# Patient Record
Sex: FEMALE | Race: WHITE | Employment: OTHER | URBAN - NONMETROPOLITAN AREA
[De-identification: names, ages, dates, MRNs, and addresses within clinical notes are randomized per-mention and may not be internally consistent; named-entity substitution may affect disease eponyms.]

---

## 2017-01-01 ENCOUNTER — HOSPITAL ENCOUNTER (OUTPATIENT)
Dept: INFUSION THERAPY | Age: 82
Discharge: HOME | End: 2017-10-16
Payer: MEDICARE

## 2017-01-01 ENCOUNTER — OFFICE VISIT (OUTPATIENT)
Dept: ONCOLOGY | Age: 82
End: 2017-01-01
Payer: MEDICARE

## 2017-01-01 VITALS
HEART RATE: 84 BPM | TEMPERATURE: 98 F | RESPIRATION RATE: 20 BRPM | OXYGEN SATURATION: 96 % | HEIGHT: 65 IN | BODY MASS INDEX: 37.32 KG/M2 | SYSTOLIC BLOOD PRESSURE: 151 MMHG | WEIGHT: 224 LBS | DIASTOLIC BLOOD PRESSURE: 78 MMHG

## 2017-01-01 DIAGNOSIS — C50.912 MALIGNANT NEOPLASM OF LEFT FEMALE BREAST, UNSPECIFIED ESTROGEN RECEPTOR STATUS, UNSPECIFIED SITE OF BREAST (HCC): Primary | ICD-10-CM

## 2017-01-01 DIAGNOSIS — C50.912 MALIGNANT NEOPLASM OF LEFT FEMALE BREAST (HCC): ICD-10-CM

## 2017-01-01 DIAGNOSIS — C50.912 MALIGNANT NEOPLASM OF LEFT FEMALE BREAST, UNSPECIFIED SITE OF BREAST: Primary | ICD-10-CM

## 2017-01-01 LAB
ALBUMIN SERPL-MCNC: 4.4 G/DL (ref 3.5–5.1)
ALP BLD-CCNC: 84 U/L (ref 38–126)
ALT SERPL-CCNC: 15 U/L (ref 11–66)
AST SERPL-CCNC: 20 U/L (ref 5–40)
BASINOPHIL, AUTOMATED: 1 % (ref 0–12)
BILIRUB SERPL-MCNC: 0.5 MG/DL (ref 0.3–1.2)
BILIRUBIN DIRECT: < 0.2 MG/DL (ref 0–0.3)
BUN, WHOLE BLOOD: 24 MG/DL (ref 8–26)
CHLORIDE, WHOLE BLOOD: 102 MEQ/L (ref 98–109)
CREATININE, WHOLE BLOOD: 1.3 MG/DL (ref 0.5–1.2)
EOSINOPHILS RELATIVE PERCENT: 2 % (ref 0–12)
GFR, ESTIMATED ,CON: 42 ML/MIN/1.73M2
GLUCOSE, WHOLE BLOOD: 138 MG/DL (ref 70–108)
HCT VFR BLD CALC: 42.7 % (ref 37–47)
HEMOGLOBIN: 14.4 GM/DL (ref 12–16)
IONIZED CALCIUM, WHOLE BLOOD: 1.19 MMOL/L (ref 1.12–1.32)
LYMPHOCYTES # BLD: 25 % (ref 15–47)
MCH RBC QN AUTO: 30.2 PG (ref 27–31)
MCHC RBC AUTO-ENTMCNC: 33.7 GM/DL (ref 33–37)
MCV RBC AUTO: 90 FL (ref 81–99)
MONOCYTES: 7 % (ref 0–12)
PDW BLD-RTO: 13.1 % (ref 11.5–14.5)
PLATELET # BLD: 240 THOU/MM3 (ref 130–400)
PMV BLD AUTO: 6.9 MCM (ref 7.4–10.4)
POTASSIUM, WHOLE BLOOD: 4.3 MEQ/L (ref 3.5–4.9)
RBC # BLD: 4.75 MILL/MM3 (ref 4.2–5.4)
SEG NEUTROPHILS: 66 % (ref 43–75)
SODIUM, WHOLE BLOOD: 143 MEQ/L (ref 138–146)
TOTAL CO2, WHOLE BLOOD: 25 MEQ/L (ref 23–33)
TOTAL PROTEIN: 8 G/DL (ref 6.1–8)
WBC # BLD: 10.9 THOU/MM3 (ref 4.8–10.8)

## 2017-01-01 PROCEDURE — 1090F PRES/ABSN URINE INCON ASSESS: CPT | Performed by: INTERNAL MEDICINE

## 2017-01-01 PROCEDURE — 1036F TOBACCO NON-USER: CPT | Performed by: INTERNAL MEDICINE

## 2017-01-01 PROCEDURE — 99211 OFF/OP EST MAY X REQ PHY/QHP: CPT

## 2017-01-01 PROCEDURE — G8417 CALC BMI ABV UP PARAM F/U: HCPCS | Performed by: INTERNAL MEDICINE

## 2017-01-01 PROCEDURE — 1123F ACP DISCUSS/DSCN MKR DOCD: CPT | Performed by: INTERNAL MEDICINE

## 2017-01-01 PROCEDURE — G8427 DOCREV CUR MEDS BY ELIG CLIN: HCPCS | Performed by: INTERNAL MEDICINE

## 2017-01-01 PROCEDURE — 80047 BASIC METABLC PNL IONIZED CA: CPT

## 2017-01-01 PROCEDURE — 85025 COMPLETE CBC W/AUTO DIFF WBC: CPT

## 2017-01-01 PROCEDURE — 99213 OFFICE O/P EST LOW 20 MIN: CPT | Performed by: INTERNAL MEDICINE

## 2017-01-01 PROCEDURE — 4040F PNEUMOC VAC/ADMIN/RCVD: CPT | Performed by: INTERNAL MEDICINE

## 2017-01-01 PROCEDURE — 36415 COLL VENOUS BLD VENIPUNCTURE: CPT

## 2017-01-01 PROCEDURE — G8484 FLU IMMUNIZE NO ADMIN: HCPCS | Performed by: INTERNAL MEDICINE

## 2017-01-01 PROCEDURE — G8400 PT W/DXA NO RESULTS DOC: HCPCS | Performed by: INTERNAL MEDICINE

## 2017-01-01 PROCEDURE — 80076 HEPATIC FUNCTION PANEL: CPT

## 2017-04-17 ENCOUNTER — OFFICE VISIT (OUTPATIENT)
Dept: ONCOLOGY | Age: 82
End: 2017-04-17

## 2017-04-17 VITALS
SYSTOLIC BLOOD PRESSURE: 152 MMHG | WEIGHT: 228.2 LBS | TEMPERATURE: 97.1 F | HEART RATE: 92 BPM | BODY MASS INDEX: 38.02 KG/M2 | OXYGEN SATURATION: 99 % | RESPIRATION RATE: 18 BRPM | HEIGHT: 65 IN | DIASTOLIC BLOOD PRESSURE: 83 MMHG

## 2017-04-17 DIAGNOSIS — Z79.811 ENCOUNTER FOR MONITORING AROMATASE INHIBITOR THERAPY: Primary | ICD-10-CM

## 2017-04-17 DIAGNOSIS — Z51.81 ENCOUNTER FOR MONITORING AROMATASE INHIBITOR THERAPY: Primary | ICD-10-CM

## 2017-04-17 PROCEDURE — 1123F ACP DISCUSS/DSCN MKR DOCD: CPT | Performed by: INTERNAL MEDICINE

## 2017-04-17 PROCEDURE — G8427 DOCREV CUR MEDS BY ELIG CLIN: HCPCS | Performed by: INTERNAL MEDICINE

## 2017-04-17 PROCEDURE — G8417 CALC BMI ABV UP PARAM F/U: HCPCS | Performed by: INTERNAL MEDICINE

## 2017-04-17 PROCEDURE — 1036F TOBACCO NON-USER: CPT | Performed by: INTERNAL MEDICINE

## 2017-04-17 PROCEDURE — G8400 PT W/DXA NO RESULTS DOC: HCPCS | Performed by: INTERNAL MEDICINE

## 2017-04-17 PROCEDURE — 1090F PRES/ABSN URINE INCON ASSESS: CPT | Performed by: INTERNAL MEDICINE

## 2017-04-17 PROCEDURE — 4040F PNEUMOC VAC/ADMIN/RCVD: CPT | Performed by: INTERNAL MEDICINE

## 2017-04-17 PROCEDURE — 99214 OFFICE O/P EST MOD 30 MIN: CPT | Performed by: INTERNAL MEDICINE

## 2017-04-17 RX ORDER — NYSTATIN 100000 [USP'U]/G
POWDER TOPICAL
Qty: 1 BOTTLE | Refills: 5 | Status: SHIPPED | OUTPATIENT
Start: 2017-04-17 | End: 2018-01-01 | Stop reason: ALTCHOICE

## 2017-10-16 NOTE — PROGRESS NOTES
Respiratory: Negative. Cardiovascular: Negative. Gastrointestinal: Negative. Genitourinary: Negative. Musculoskeletal: Negative. Skin: Negative. Neurological: Negative. Hematological: Negative. Psychiatric/Behavioral: Negative. Objective:   Physical Exam   Vitals:    10/16/17 1457   BP: (!) 151/78   Pulse: 84   Resp: 20   Temp: 98 °F (36.7 °C)   SpO2: 96%   Vitals reviewed and are stable. Constitutional: Well-developed and well-nourished. No acute distress. HENT: Normocephalic and atraumatic. Eyes: Pupils are equal and reactive. No scleral icterus. Neck: Overall appearance is symmetrical. No identifiable masses. Chest: Inspection and palpation of chest is normal.  Pulmonary: Effort normal. No respiratory distress. Cardiovascular: RRR. No edema in any of the four extremities. Abdominal: Soft. No hepatomegaly or splenomegaly. Musculoskeletal: Gait is normal. Muscle strength and tone good. Neurological: Alert and oriented to person, place, and time. Judgment and thought content normal.  Skin: Skin is warm and dry. No rash. Psychiatric: Mood and affect appropriate for the clinical situation. Behavior is normal.        Data Analysis:    Hematology 10/16/2017 4/17/2017 10/17/2016   WBC 10.9 (H) 9.8 10.8   RBC 4.75 4.88 4.71   HGB 14.4 15.0 14.5   HCT 42.7 42.5 42.6   MCV 90 87 90   RDW 13.1 12.6 12.3    243 261     Hematology 4/4/2016   WBC 10.9 (H)   RBC 4.62   HGB 14.3   HCT 41.9   MCV 91   RDW 12.4        Assessment:   1. Breast cancer. 2.  Hypertension. 3.  Leukocytosis. Plan:   1. Monitor for recurrence of malignancy. 2.  Monitor blood pressure and follow up with primary care provider for further surveillance and management. 3.  Monitor total WBC count and for signs of infection/fever. 4.  Return to clinic in one year.      Cruz Odonnell M.D.  [facility]  [facility] [address], [address] [place], [address] [place] [address], [address] \[facility]\""

## 2018-01-01 ENCOUNTER — ANESTHESIA (OUTPATIENT)
Dept: OPERATING ROOM | Age: 83
DRG: 236 | End: 2018-01-01
Payer: MEDICARE

## 2018-01-01 ENCOUNTER — APPOINTMENT (OUTPATIENT)
Dept: GENERAL RADIOLOGY | Age: 83
DRG: 236 | End: 2018-01-01
Attending: THORACIC SURGERY (CARDIOTHORACIC VASCULAR SURGERY)
Payer: MEDICARE

## 2018-01-01 ENCOUNTER — HOSPITAL ENCOUNTER (INPATIENT)
Age: 83
LOS: 11 days | Discharge: NURSING, REHAB OR LONG-TERM CARE | DRG: 236 | End: 2018-02-23
Attending: THORACIC SURGERY (CARDIOTHORACIC VASCULAR SURGERY) | Admitting: THORACIC SURGERY (CARDIOTHORACIC VASCULAR SURGERY)
Payer: MEDICARE

## 2018-01-01 ENCOUNTER — TELEPHONE (OUTPATIENT)
Dept: CARDIOLOGY CLINIC | Age: 83
End: 2018-01-01

## 2018-01-01 ENCOUNTER — APPOINTMENT (OUTPATIENT)
Dept: GENERAL RADIOLOGY | Age: 83
DRG: 287 | End: 2018-01-01
Attending: INTERNAL MEDICINE
Payer: MEDICARE

## 2018-01-01 ENCOUNTER — PREP FOR PROCEDURE (OUTPATIENT)
Dept: CARDIOLOGY CLINIC | Age: 83
End: 2018-01-01

## 2018-01-01 ENCOUNTER — HOSPITAL ENCOUNTER (OUTPATIENT)
Dept: GENERAL RADIOLOGY | Age: 83
Discharge: HOME | DRG: 236 | End: 2018-02-09
Attending: THORACIC SURGERY (CARDIOTHORACIC VASCULAR SURGERY)
Payer: MEDICARE

## 2018-01-01 ENCOUNTER — HOSPITAL ENCOUNTER (INPATIENT)
Dept: INPATIENT UNIT | Age: 83
LOS: 1 days | Discharge: HOME | DRG: 287 | End: 2018-02-08
Attending: INTERNAL MEDICINE | Admitting: INTERNAL MEDICINE
Payer: MEDICARE

## 2018-01-01 ENCOUNTER — TELEPHONE (OUTPATIENT)
Dept: CARDIOTHORACIC SURGERY | Age: 83
End: 2018-01-01

## 2018-01-01 ENCOUNTER — OFFICE VISIT (OUTPATIENT)
Dept: CARDIOTHORACIC SURGERY | Age: 83
End: 2018-01-01

## 2018-01-01 ENCOUNTER — APPOINTMENT (OUTPATIENT)
Dept: GENERAL RADIOLOGY | Age: 83
DRG: 215 | End: 2018-01-01
Attending: INTERNAL MEDICINE
Payer: MEDICARE

## 2018-01-01 ENCOUNTER — ANESTHESIA EVENT (OUTPATIENT)
Dept: OPERATING ROOM | Age: 83
DRG: 236 | End: 2018-01-01
Payer: MEDICARE

## 2018-01-01 ENCOUNTER — APPOINTMENT (OUTPATIENT)
Dept: INTERVENTIONAL RADIOLOGY/VASCULAR | Age: 83
DRG: 287 | End: 2018-01-01
Attending: INTERNAL MEDICINE
Payer: MEDICARE

## 2018-01-01 ENCOUNTER — HOSPITAL ENCOUNTER (INPATIENT)
Age: 83
LOS: 1 days | DRG: 215 | End: 2018-06-19
Attending: INTERNAL MEDICINE | Admitting: INTERNAL MEDICINE
Payer: MEDICARE

## 2018-01-01 ENCOUNTER — ANESTHESIA (OUTPATIENT)
Dept: CARDIOVASCULAR ICU | Age: 83
DRG: 215 | End: 2018-01-01
Payer: MEDICARE

## 2018-01-01 ENCOUNTER — APPOINTMENT (OUTPATIENT)
Dept: CARDIAC CATH/INVASIVE PROCEDURES | Age: 83
DRG: 287 | End: 2018-01-01
Attending: INTERNAL MEDICINE
Payer: MEDICARE

## 2018-01-01 ENCOUNTER — ANESTHESIA EVENT (OUTPATIENT)
Dept: CARDIOVASCULAR ICU | Age: 83
DRG: 215 | End: 2018-01-01
Payer: MEDICARE

## 2018-01-01 ENCOUNTER — APPOINTMENT (OUTPATIENT)
Dept: CARDIAC CATH/INVASIVE PROCEDURES | Age: 83
DRG: 215 | End: 2018-01-01
Attending: INTERNAL MEDICINE
Payer: MEDICARE

## 2018-01-01 ENCOUNTER — HOSPITAL ENCOUNTER (OUTPATIENT)
Dept: PREADMISSION TESTING | Age: 83
Discharge: HOME | DRG: 236 | End: 2018-02-13
Payer: MEDICARE

## 2018-01-01 VITALS
BODY MASS INDEX: 30.07 KG/M2 | HEIGHT: 67 IN | WEIGHT: 191.6 LBS | DIASTOLIC BLOOD PRESSURE: 86 MMHG | HEART RATE: 69 BPM | SYSTOLIC BLOOD PRESSURE: 173 MMHG

## 2018-01-01 VITALS
TEMPERATURE: 97 F | WEIGHT: 211 LBS | HEART RATE: 70 BPM | BODY MASS INDEX: 33.12 KG/M2 | DIASTOLIC BLOOD PRESSURE: 70 MMHG | HEIGHT: 67 IN | SYSTOLIC BLOOD PRESSURE: 140 MMHG | RESPIRATION RATE: 16 BRPM | OXYGEN SATURATION: 98 %

## 2018-01-01 VITALS
BODY MASS INDEX: 30.1 KG/M2 | HEIGHT: 67 IN | SYSTOLIC BLOOD PRESSURE: 145 MMHG | HEART RATE: 68 BPM | WEIGHT: 191.8 LBS | DIASTOLIC BLOOD PRESSURE: 73 MMHG

## 2018-01-01 VITALS
WEIGHT: 216.05 LBS | RESPIRATION RATE: 18 BRPM | DIASTOLIC BLOOD PRESSURE: 52 MMHG | SYSTOLIC BLOOD PRESSURE: 113 MMHG | TEMPERATURE: 97.3 F | HEART RATE: 96 BPM | HEIGHT: 66 IN | BODY MASS INDEX: 34.72 KG/M2

## 2018-01-01 VITALS
SYSTOLIC BLOOD PRESSURE: 143 MMHG | HEART RATE: 72 BPM | HEIGHT: 66 IN | DIASTOLIC BLOOD PRESSURE: 75 MMHG | TEMPERATURE: 97.7 F | BODY MASS INDEX: 36.32 KG/M2 | OXYGEN SATURATION: 96 % | WEIGHT: 225.97 LBS | RESPIRATION RATE: 16 BRPM

## 2018-01-01 VITALS
DIASTOLIC BLOOD PRESSURE: 57 MMHG | BODY MASS INDEX: 30.07 KG/M2 | HEART RATE: 76 BPM | RESPIRATION RATE: 16 BRPM | SYSTOLIC BLOOD PRESSURE: 102 MMHG | TEMPERATURE: 97.5 F | WEIGHT: 191.6 LBS | HEIGHT: 67 IN | OXYGEN SATURATION: 95 %

## 2018-01-01 VITALS
WEIGHT: 198 LBS | BODY MASS INDEX: 31.08 KG/M2 | HEIGHT: 67 IN | DIASTOLIC BLOOD PRESSURE: 69 MMHG | HEART RATE: 72 BPM | SYSTOLIC BLOOD PRESSURE: 123 MMHG

## 2018-01-01 VITALS — SYSTOLIC BLOOD PRESSURE: 72 MMHG | DIASTOLIC BLOOD PRESSURE: 43 MMHG | OXYGEN SATURATION: 100 % | TEMPERATURE: 14.7 F

## 2018-01-01 VITALS
HEIGHT: 67 IN | BODY MASS INDEX: 30.07 KG/M2 | WEIGHT: 191.6 LBS | HEART RATE: 63 BPM | DIASTOLIC BLOOD PRESSURE: 90 MMHG | SYSTOLIC BLOOD PRESSURE: 152 MMHG

## 2018-01-01 DIAGNOSIS — I25.119 CORONARY ARTERY DISEASE INVOLVING NATIVE CORONARY ARTERY OF NATIVE HEART WITH ANGINA PECTORIS (HCC): ICD-10-CM

## 2018-01-01 DIAGNOSIS — E44.0 MODERATE MALNUTRITION (HCC): ICD-10-CM

## 2018-01-01 DIAGNOSIS — T81.30XA WOUND DEHISCENCE: Primary | ICD-10-CM

## 2018-01-01 DIAGNOSIS — T81.31XD POSTOPERATIVE WOUND DEHISCENCE, SUBSEQUENT ENCOUNTER: Primary | ICD-10-CM

## 2018-01-01 DIAGNOSIS — R79.9 ABNORMAL FINDING OF BLOOD CHEMISTRY: ICD-10-CM

## 2018-01-01 DIAGNOSIS — A04.72 CLOSTRIDIUM DIFFICILE COLITIS: ICD-10-CM

## 2018-01-01 DIAGNOSIS — I25.119 CORONARY ARTERY DISEASE INVOLVING NATIVE CORONARY ARTERY OF NATIVE HEART WITH ANGINA PECTORIS (HCC): Primary | ICD-10-CM

## 2018-01-01 DIAGNOSIS — Z95.1 S/P CABG X 2: ICD-10-CM

## 2018-01-01 DIAGNOSIS — Z98.890 S/P CARDIAC CATH: Primary | ICD-10-CM

## 2018-01-01 LAB
ABO: NORMAL
ACTIVATED CLOTTING TIME: 129 SECONDS (ref 99–130)
ACTIVATED CLOTTING TIME: 132 SECONDS (ref 99–130)
ACTIVATED CLOTTING TIME: 166 SECONDS (ref 99–130)
ACTIVATED CLOTTING TIME: 373 SECONDS (ref 99–130)
ACTIVATED CLOTTING TIME: 506 SECONDS (ref 99–130)
ACTIVATED CLOTTING TIME: 559 SECONDS (ref 99–130)
ACTIVATED CLOTTING TIME: 630 SECONDS (ref 99–130)
ALLEN TEST: POSITIVE
ALLEN TEST: POSITIVE
AMORPHOUS: ABNORMAL
ANION GAP SERPL CALCULATED.3IONS-SCNC: 11 MEQ/L (ref 8–16)
ANION GAP SERPL CALCULATED.3IONS-SCNC: 11 MEQ/L (ref 8–16)
ANION GAP SERPL CALCULATED.3IONS-SCNC: 12 MEQ/L (ref 8–16)
ANION GAP SERPL CALCULATED.3IONS-SCNC: 13 MEQ/L (ref 8–16)
ANION GAP SERPL CALCULATED.3IONS-SCNC: 14 MEQ/L (ref 8–16)
ANION GAP SERPL CALCULATED.3IONS-SCNC: 15 MEQ/L (ref 8–16)
ANION GAP SERPL CALCULATED.3IONS-SCNC: 16 MEQ/L (ref 8–16)
ANION GAP SERPL CALCULATED.3IONS-SCNC: 16 MEQ/L (ref 8–16)
ANION GAP SERPL CALCULATED.3IONS-SCNC: 17 MEQ/L (ref 8–16)
ANION GAP SERPL CALCULATED.3IONS-SCNC: 18 MEQ/L (ref 8–16)
ANION GAP SERPL CALCULATED.3IONS-SCNC: 9 MEQ/L (ref 8–16)
ANTIBODY SCREEN: NORMAL
APTT: 25.3 SECONDS (ref 22–38)
APTT: 29.4 SECONDS (ref 22–38)
APTT: 45.4 SECONDS (ref 22–38)
APTT: 50.8 SECONDS (ref 22–38)
APTT: 64.1 SECONDS (ref 22–38)
APTT: 77 SECONDS (ref 22–38)
AVERAGE GLUCOSE: 84 MG/DL (ref 70–126)
BACTERIA: ABNORMAL
BACTERIA: NORMAL
BASE EXCESS (CALCULATED): -0.2 MMOL/L (ref -2.5–2.5)
BASE EXCESS (CALCULATED): -1.8 MMOL/L (ref -2.5–2.5)
BASE EXCESS (CALCULATED): -2.2 MMOL/L (ref -2.5–2.5)
BASE EXCESS (CALCULATED): -2.3 MMOL/L (ref -2.5–2.5)
BASE EXCESS (CALCULATED): -2.6 MMOL/L (ref -2.5–2.5)
BASE EXCESS (CALCULATED): -2.9 MMOL/L (ref -2.5–2.5)
BASE EXCESS (CALCULATED): -3.5 MMOL/L (ref -2.5–2.5)
BASE EXCESS (CALCULATED): 0.6 MMOL/L (ref -2.5–2.5)
BASE EXCESS (CALCULATED): 0.9 MMOL/L (ref -2.5–2.5)
BASE EXCESS (CALCULATED): 1.1 MMOL/L (ref -2.5–2.5)
BASE EXCESS (CALCULATED): 1.2 MMOL/L (ref -2.5–2.5)
BASE EXCESS (CALCULATED): 15.3 MMOL/L (ref -2.5–2.5)
BILIRUBIN URINE: ABNORMAL
BILIRUBIN URINE: NEGATIVE
BLOOD, URINE: ABNORMAL
BLOOD, URINE: ABNORMAL
BLOOD, URINE: NEGATIVE
BUN BLDV-MCNC: 13 MG/DL (ref 7–22)
BUN BLDV-MCNC: 18 MG/DL (ref 7–22)
BUN BLDV-MCNC: 20 MG/DL (ref 7–22)
BUN BLDV-MCNC: 26 MG/DL (ref 7–22)
BUN BLDV-MCNC: 29 MG/DL (ref 7–22)
BUN BLDV-MCNC: 29 MG/DL (ref 7–22)
BUN BLDV-MCNC: 30 MG/DL (ref 7–22)
BUN BLDV-MCNC: 32 MG/DL (ref 7–22)
BUN BLDV-MCNC: 35 MG/DL (ref 7–22)
BUN BLDV-MCNC: 36 MG/DL (ref 7–22)
BUN BLDV-MCNC: 39 MG/DL (ref 7–22)
BUN BLDV-MCNC: 40 MG/DL (ref 7–22)
BUN BLDV-MCNC: 42 MG/DL (ref 7–22)
BUN BLDV-MCNC: 44 MG/DL (ref 7–22)
BUN BLDV-MCNC: 45 MG/DL (ref 7–22)
CALCIUM IONIZED SERUM: 1.01 MMOL/L (ref 1.12–1.32)
CALCIUM IONIZED SERUM: 1.02 MMOL/L (ref 1.12–1.32)
CALCIUM IONIZED SERUM: 1.07 MMOL/L (ref 1.12–1.32)
CALCIUM IONIZED SERUM: 1.14 MMOL/L (ref 1.12–1.32)
CALCIUM IONIZED SERUM: 1.15 MMOL/L (ref 1.12–1.32)
CALCIUM IONIZED: 1.1 MMOL/L (ref 1.12–1.32)
CALCIUM IONIZED: 1.11 MMOL/L (ref 1.12–1.32)
CALCIUM IONIZED: 1.24 MMOL/L (ref 1.12–1.32)
CALCIUM SERPL-MCNC: 7.6 MG/DL (ref 8.5–10.5)
CALCIUM SERPL-MCNC: 7.7 MG/DL (ref 8.5–10.5)
CALCIUM SERPL-MCNC: 7.8 MG/DL (ref 8.5–10.5)
CALCIUM SERPL-MCNC: 7.8 MG/DL (ref 8.5–10.5)
CALCIUM SERPL-MCNC: 7.9 MG/DL (ref 8.5–10.5)
CALCIUM SERPL-MCNC: 8 MG/DL (ref 8.5–10.5)
CALCIUM SERPL-MCNC: 8 MG/DL (ref 8.5–10.5)
CALCIUM SERPL-MCNC: 8.1 MG/DL (ref 8.5–10.5)
CALCIUM SERPL-MCNC: 8.7 MG/DL (ref 8.5–10.5)
CALCIUM SERPL-MCNC: 9.5 MG/DL (ref 8.5–10.5)
CARTRIDGE COLOR: NORMAL
CARTRIDGE COLOR: NORMAL
CASTS: ABNORMAL /LPF
CASTS: NORMAL /LPF
CASTS: NORMAL /LPF
CHARACTER, URINE: ABNORMAL
CHARACTER, URINE: CLEAR
CHARACTER, URINE: CLEAR
CHLORIDE BLD-SCNC: 102 MEQ/L (ref 98–111)
CHLORIDE BLD-SCNC: 102 MEQ/L (ref 98–111)
CHLORIDE BLD-SCNC: 103 MEQ/L (ref 98–111)
CHLORIDE BLD-SCNC: 105 MEQ/L (ref 98–111)
CHLORIDE BLD-SCNC: 106 MEQ/L (ref 98–111)
CHLORIDE BLD-SCNC: 108 MEQ/L (ref 98–111)
CHLORIDE BLD-SCNC: 109 MEQ/L (ref 98–111)
CHOLESTEROL, TOTAL: 143 MG/DL (ref 100–199)
CHOLESTEROL, TOTAL: 170 MG/DL (ref 100–199)
CLOSTRIDIUM DIFFICILE, PCR: POSITIVE
CO2: 19 MEQ/L (ref 23–33)
CO2: 20 MEQ/L (ref 23–33)
CO2: 20 MEQ/L (ref 23–33)
CO2: 21 MEQ/L (ref 23–33)
CO2: 22 MEQ/L (ref 23–33)
CO2: 24 MEQ/L (ref 23–33)
CO2: 24 MEQ/L (ref 23–33)
CO2: 25 MEQ/L (ref 23–33)
COLLECTED BY:: ABNORMAL
COLLECTED BY:: NORMAL
COLLECTED BY:: NORMAL
COLOR: ABNORMAL
COLOR: YELLOW
COMMENT: ABNORMAL
CREAT SERPL-MCNC: 0.9 MG/DL (ref 0.4–1.2)
CREAT SERPL-MCNC: 1.1 MG/DL (ref 0.4–1.2)
CREAT SERPL-MCNC: 1.2 MG/DL (ref 0.4–1.2)
CREAT SERPL-MCNC: 1.3 MG/DL (ref 0.4–1.2)
CREAT SERPL-MCNC: 1.4 MG/DL (ref 0.4–1.2)
CREAT SERPL-MCNC: 1.4 MG/DL (ref 0.4–1.2)
CREAT SERPL-MCNC: 1.6 MG/DL (ref 0.4–1.2)
CREAT SERPL-MCNC: 1.6 MG/DL (ref 0.4–1.2)
CREAT SERPL-MCNC: 1.7 MG/DL (ref 0.4–1.2)
CRYSTALS: ABNORMAL
CRYSTALS: NORMAL
DEVICE: ABNORMAL
DEVICE: NORMAL
DEVICE: NORMAL
EKG ATRIAL RATE: 120 BPM
EKG ATRIAL RATE: 60 BPM
EKG ATRIAL RATE: 68 BPM
EKG ATRIAL RATE: 70 BPM
EKG ATRIAL RATE: 71 BPM
EKG ATRIAL RATE: 74 BPM
EKG ATRIAL RATE: 86 BPM
EKG P AXIS: -9 DEGREES
EKG P AXIS: 11 DEGREES
EKG P AXIS: 29 DEGREES
EKG P AXIS: 39 DEGREES
EKG P AXIS: 58 DEGREES
EKG P AXIS: 64 DEGREES
EKG P-R INTERVAL: 166 MS
EKG P-R INTERVAL: 180 MS
EKG P-R INTERVAL: 188 MS
EKG P-R INTERVAL: 194 MS
EKG P-R INTERVAL: 198 MS
EKG P-R INTERVAL: 208 MS
EKG Q-T INTERVAL: 398 MS
EKG Q-T INTERVAL: 402 MS
EKG Q-T INTERVAL: 422 MS
EKG Q-T INTERVAL: 438 MS
EKG Q-T INTERVAL: 446 MS
EKG Q-T INTERVAL: 452 MS
EKG Q-T INTERVAL: 480 MS
EKG QRS DURATION: 106 MS
EKG QRS DURATION: 116 MS
EKG QRS DURATION: 138 MS
EKG QRS DURATION: 86 MS
EKG QRS DURATION: 90 MS
EKG QRS DURATION: 92 MS
EKG QRS DURATION: 96 MS
EKG QTC CALCULATION (BAZETT): 458 MS
EKG QTC CALCULATION (BAZETT): 473 MS
EKG QTC CALCULATION (BAZETT): 476 MS
EKG QTC CALCULATION (BAZETT): 480 MS
EKG QTC CALCULATION (BAZETT): 480 MS
EKG QTC CALCULATION (BAZETT): 495 MS
EKG QTC CALCULATION (BAZETT): 586 MS
EKG R AXIS: -17 DEGREES
EKG R AXIS: -19 DEGREES
EKG R AXIS: 21 DEGREES
EKG R AXIS: 23 DEGREES
EKG R AXIS: 42 DEGREES
EKG R AXIS: 8 DEGREES
EKG T AXIS: 125 DEGREES
EKG T AXIS: 179 DEGREES
EKG T AXIS: 22 DEGREES
EKG T AXIS: 30 DEGREES
EKG T AXIS: 51 DEGREES
EKG T AXIS: 75 DEGREES
EKG T AXIS: 76 DEGREES
EKG VENTRICULAR RATE: 128 BPM
EKG VENTRICULAR RATE: 60 BPM
EKG VENTRICULAR RATE: 68 BPM
EKG VENTRICULAR RATE: 70 BPM
EKG VENTRICULAR RATE: 71 BPM
EKG VENTRICULAR RATE: 74 BPM
EKG VENTRICULAR RATE: 86 BPM
EPITHELIAL CELLS, UA: ABNORMAL /HPF
EPITHELIAL CELLS, UA: NORMAL /HPF
FIBRINOGEN: 230 MG/100ML (ref 155–475)
GFR SERPL CREATININE-BSD FRML MDRD: 29 ML/MIN/1.73M2
GFR SERPL CREATININE-BSD FRML MDRD: 31 ML/MIN/1.73M2
GFR SERPL CREATININE-BSD FRML MDRD: 31 ML/MIN/1.73M2
GFR SERPL CREATININE-BSD FRML MDRD: 36 ML/MIN/1.73M2
GFR SERPL CREATININE-BSD FRML MDRD: 36 ML/MIN/1.73M2
GFR SERPL CREATININE-BSD FRML MDRD: 39 ML/MIN/1.73M2
GFR SERPL CREATININE-BSD FRML MDRD: 43 ML/MIN/1.73M2
GFR SERPL CREATININE-BSD FRML MDRD: 48 ML/MIN/1.73M2
GFR SERPL CREATININE-BSD FRML MDRD: 60 ML/MIN/1.73M2
GLUCOSE BLD-MCNC: 101 MG/DL (ref 70–108)
GLUCOSE BLD-MCNC: 101 MG/DL (ref 70–108)
GLUCOSE BLD-MCNC: 102 MG/DL (ref 70–108)
GLUCOSE BLD-MCNC: 105 MG/DL (ref 70–108)
GLUCOSE BLD-MCNC: 109 MG/DL (ref 70–108)
GLUCOSE BLD-MCNC: 111 MG/DL (ref 70–108)
GLUCOSE BLD-MCNC: 112 MG/DL (ref 70–108)
GLUCOSE BLD-MCNC: 113 MG/DL (ref 70–108)
GLUCOSE BLD-MCNC: 114 MG/DL (ref 70–108)
GLUCOSE BLD-MCNC: 115 MG/DL (ref 70–108)
GLUCOSE BLD-MCNC: 116 MG/DL (ref 70–108)
GLUCOSE BLD-MCNC: 117 MG/DL (ref 70–108)
GLUCOSE BLD-MCNC: 118 MG/DL (ref 70–108)
GLUCOSE BLD-MCNC: 119 MG/DL (ref 70–108)
GLUCOSE BLD-MCNC: 119 MG/DL (ref 70–108)
GLUCOSE BLD-MCNC: 120 MG/DL (ref 70–108)
GLUCOSE BLD-MCNC: 121 MG/DL (ref 70–108)
GLUCOSE BLD-MCNC: 122 MG/DL (ref 70–108)
GLUCOSE BLD-MCNC: 123 MG/DL (ref 70–108)
GLUCOSE BLD-MCNC: 125 MG/DL (ref 70–108)
GLUCOSE BLD-MCNC: 126 MG/DL (ref 70–108)
GLUCOSE BLD-MCNC: 128 MG/DL (ref 70–108)
GLUCOSE BLD-MCNC: 132 MG/DL (ref 70–108)
GLUCOSE BLD-MCNC: 136 MG/DL (ref 70–108)
GLUCOSE BLD-MCNC: 137 MG/DL (ref 70–108)
GLUCOSE BLD-MCNC: 137 MG/DL (ref 70–108)
GLUCOSE BLD-MCNC: 138 MG/DL (ref 70–108)
GLUCOSE BLD-MCNC: 140 MG/DL (ref 70–108)
GLUCOSE BLD-MCNC: 149 MG/DL (ref 70–108)
GLUCOSE BLD-MCNC: 156 MG/DL (ref 70–108)
GLUCOSE BLD-MCNC: 81 MG/DL (ref 70–108)
GLUCOSE BLD-MCNC: 82 MG/DL (ref 70–108)
GLUCOSE BLD-MCNC: 85 MG/DL (ref 70–108)
GLUCOSE BLD-MCNC: 86 MG/DL (ref 70–108)
GLUCOSE BLD-MCNC: 86 MG/DL (ref 70–108)
GLUCOSE BLD-MCNC: 87 MG/DL (ref 70–108)
GLUCOSE BLD-MCNC: 87 MG/DL (ref 70–108)
GLUCOSE BLD-MCNC: 92 MG/DL (ref 70–108)
GLUCOSE BLD-MCNC: 92 MG/DL (ref 70–108)
GLUCOSE BLD-MCNC: 94 MG/DL (ref 70–108)
GLUCOSE BLD-MCNC: 94 MG/DL (ref 70–108)
GLUCOSE BLD-MCNC: 99 MG/DL (ref 70–108)
GLUCOSE, URINE: NEGATIVE MG/DL
GLUCOSE, WHOLE BLOOD: 112 MG/DL (ref 70–108)
GLUCOSE, WHOLE BLOOD: 115 MG/DL (ref 70–108)
GLUCOSE, WHOLE BLOOD: 121 MG/DL (ref 70–108)
GLUCOSE, WHOLE BLOOD: 126 MG/DL (ref 70–108)
GLUCOSE, WHOLE BLOOD: 134 MG/DL (ref 70–108)
GLUCOSE, WHOLE BLOOD: 139 MG/DL (ref 70–108)
GLUCOSE, WHOLE BLOOD: 147 MG/DL (ref 70–108)
GLUCOSE, WHOLE BLOOD: 148 MG/DL (ref 70–108)
GLUCOSE, WHOLE BLOOD: 149 MG/DL (ref 70–108)
GLUCOSE, WHOLE BLOOD: 154 MG/DL (ref 70–108)
GLUCOSE, WHOLE BLOOD: 158 MG/DL (ref 70–108)
GLUCOSE, WHOLE BLOOD: 158 MG/DL (ref 70–108)
GLUCOSE, WHOLE BLOOD: 164 MG/DL (ref 70–108)
HBA1C MFR BLD: 4.8 % (ref 4.4–6.4)
HCO3: 20 MMOL/L (ref 23–28)
HCO3: 22 MMOL/L (ref 23–28)
HCO3: 23 MMOL/L (ref 23–28)
HCO3: 25 MMOL/L (ref 23–28)
HCO3: 26 MMOL/L (ref 23–28)
HCO3: 44 MMOL/L (ref 23–28)
HCT VFR BLD CALC: 20.2 % (ref 37–47)
HCT VFR BLD CALC: 22.4 % (ref 37–47)
HCT VFR BLD CALC: 22.8 % (ref 37–47)
HCT VFR BLD CALC: 22.8 % (ref 37–47)
HCT VFR BLD CALC: 23.7 % (ref 37–47)
HCT VFR BLD CALC: 24.5 % (ref 37–47)
HCT VFR BLD CALC: 26.2 % (ref 37–47)
HCT VFR BLD CALC: 26.3 % (ref 37–47)
HCT VFR BLD CALC: 26.7 % (ref 37–47)
HCT VFR BLD CALC: 27.2 % (ref 37–47)
HCT VFR BLD CALC: 27.5 % (ref 37–47)
HCT VFR BLD CALC: 29.1 % (ref 37–47)
HCT VFR BLD CALC: 29.4 % (ref 37–47)
HCT VFR BLD CALC: 29.8 % (ref 37–47)
HCT VFR BLD CALC: 30.1 % (ref 37–47)
HCT VFR BLD CALC: 36 % (ref 37–47)
HCT VFR BLD CALC: 38.2 % (ref 37–47)
HCT VFR BLD CALC: 38.8 % (ref 37–47)
HCT VFR BLD CALC: 41.4 % (ref 37–47)
HCT VFR BLD CALC: 43.9 % (ref 37–47)
HDLC SERPL-MCNC: 47 MG/DL
HDLC SERPL-MCNC: 50 MG/DL
HEMOGLOBIN FINGERSTICK, POC: 11.7 G/DL (ref 12–16)
HEMOGLOBIN FINGERSTICK, POC: 7.1 G/DL (ref 12–16)
HEMOGLOBIN FINGERSTICK, POC: 9.2 G/DL (ref 12–16)
HEMOGLOBIN FINGERSTICK, POC: 9.3 G/DL (ref 12–16)
HEMOGLOBIN: 10 GM/DL (ref 12–16)
HEMOGLOBIN: 10.2 GM/DL (ref 12–16)
HEMOGLOBIN: 11.4 GM/DL (ref 12–16)
HEMOGLOBIN: 12.4 GM/DL (ref 12–16)
HEMOGLOBIN: 12.9 GM/DL (ref 12–16)
HEMOGLOBIN: 14.2 GM/DL (ref 12–16)
HEMOGLOBIN: 14.8 GM/DL (ref 12–16)
HEMOGLOBIN: 6.8 GM/DL (ref 12–16)
HEMOGLOBIN: 7.4 GM/DL (ref 12–16)
HEMOGLOBIN: 7.6 GM/DL (ref 12–16)
HEMOGLOBIN: 7.7 GM/DL (ref 12–16)
HEMOGLOBIN: 7.9 GM/DL (ref 12–16)
HEMOGLOBIN: 8.3 GM/DL (ref 12–16)
HEMOGLOBIN: 8.7 GM/DL (ref 12–16)
HEMOGLOBIN: 8.8 GM/DL (ref 12–16)
HEMOGLOBIN: 9.2 GM/DL (ref 12–16)
HEMOGLOBIN: 9.3 GM/DL (ref 12–16)
HEMOGLOBIN: 9.5 GM/DL (ref 12–16)
HEMOGLOBIN: 9.8 GM/DL (ref 12–16)
HEMOGLOBIN: 9.8 GM/DL (ref 12–16)
ICTOTEST: NEGATIVE
IFIO2: 100
IFIO2: 35
IFIO2: 35
IFIO2: 6
IFIO2: 80
INR BLD: 1.09 (ref 0.85–1.13)
INR BLD: 1.15 (ref 0.85–1.13)
INR BLD: 1.37 (ref 0.85–1.13)
KETONES, URINE: NEGATIVE
LDL CHOLESTEROL CALCULATED: 104 MG/DL
LDL CHOLESTEROL CALCULATED: 67 MG/DL
LEUKOCYTE EST, POC: ABNORMAL
LEUKOCYTE EST, POC: NEGATIVE
LEUKOCYTE ESTERASE, URINE: NEGATIVE
MAGNESIUM: 1.7 MG/DL (ref 1.6–2.4)
MAGNESIUM: 1.7 MG/DL (ref 1.6–2.4)
MAGNESIUM: 2.1 MG/DL (ref 1.6–2.4)
MAGNESIUM: 2.1 MG/DL (ref 1.6–2.4)
MAGNESIUM: 2.2 MG/DL (ref 1.6–2.4)
MAGNESIUM: 2.6 MG/DL (ref 1.6–2.4)
MCH RBC QN AUTO: 26.9 PG (ref 27–31)
MCH RBC QN AUTO: 27.7 PG (ref 27–31)
MCH RBC QN AUTO: 30 PG (ref 27–31)
MCH RBC QN AUTO: 30.2 PG (ref 27–31)
MCH RBC QN AUTO: 30.4 PG (ref 27–31)
MCH RBC QN AUTO: 30.5 PG (ref 27–31)
MCH RBC QN AUTO: 30.6 PG (ref 27–31)
MCH RBC QN AUTO: 30.8 PG (ref 27–31)
MCH RBC QN AUTO: 30.8 PG (ref 27–31)
MCH RBC QN AUTO: 31.1 PG (ref 27–31)
MCH RBC QN AUTO: 31.7 PG (ref 27–31)
MCH RBC QN AUTO: 31.9 PG (ref 27–31)
MCH RBC QN AUTO: 32 PG (ref 27–31)
MCH RBC QN AUTO: 32.2 PG (ref 27–31)
MCH RBC QN AUTO: 32.6 PG (ref 27–31)
MCH RBC QN AUTO: 32.6 PG (ref 27–31)
MCH RBC QN AUTO: 33.8 PG (ref 27–31)
MCHC RBC AUTO-ENTMCNC: 31.6 GM/DL (ref 33–37)
MCHC RBC AUTO-ENTMCNC: 32.4 GM/DL (ref 33–37)
MCHC RBC AUTO-ENTMCNC: 33.2 GM/DL (ref 33–37)
MCHC RBC AUTO-ENTMCNC: 33.3 GM/DL (ref 33–37)
MCHC RBC AUTO-ENTMCNC: 33.4 GM/DL (ref 33–37)
MCHC RBC AUTO-ENTMCNC: 33.5 GM/DL (ref 33–37)
MCHC RBC AUTO-ENTMCNC: 33.6 GM/DL (ref 33–37)
MCHC RBC AUTO-ENTMCNC: 33.7 GM/DL (ref 33–37)
MCHC RBC AUTO-ENTMCNC: 33.9 GM/DL (ref 33–37)
MCHC RBC AUTO-ENTMCNC: 34 GM/DL (ref 33–37)
MCHC RBC AUTO-ENTMCNC: 34.2 GM/DL (ref 33–37)
MCHC RBC AUTO-ENTMCNC: 34.3 GM/DL (ref 33–37)
MCHC RBC AUTO-ENTMCNC: 35.7 GM/DL (ref 33–37)
MCV RBC AUTO: 85 FL (ref 81–99)
MCV RBC AUTO: 85.4 FL (ref 81–99)
MCV RBC AUTO: 89.9 FL (ref 81–99)
MCV RBC AUTO: 90 FL (ref 81–99)
MCV RBC AUTO: 90.1 FL (ref 81–99)
MCV RBC AUTO: 90.8 FL (ref 81–99)
MCV RBC AUTO: 91.3 FL (ref 81–99)
MCV RBC AUTO: 91.3 FL (ref 81–99)
MCV RBC AUTO: 91.4 FL (ref 81–99)
MCV RBC AUTO: 91.6 FL (ref 81–99)
MCV RBC AUTO: 94.5 FL (ref 81–99)
MCV RBC AUTO: 94.7 FL (ref 81–99)
MCV RBC AUTO: 95 FL (ref 81–99)
MCV RBC AUTO: 95.4 FL (ref 81–99)
MCV RBC AUTO: 95.5 FL (ref 81–99)
MCV RBC AUTO: 95.9 FL (ref 81–99)
MCV RBC AUTO: 96.1 FL (ref 81–99)
MISCELLANEOUS LAB TEST RESULT: ABNORMAL
MISCELLANEOUS LAB TEST RESULT: NORMAL
MODE: ABNORMAL
MODE: AC
MODE: NORMAL
MRSA SCREEN RT-PCR: NEGATIVE
MUCUS: ABNORMAL
NITRITE, URINE: NEGATIVE
O2 SATURATION: 100 %
O2 SATURATION: 82 %
O2 SATURATION: 89 %
O2 SATURATION: 95 %
O2 SATURATION: 96 %
O2 SATURATION: 97 %
O2 SATURATION: 98 %
ORGANISM: ABNORMAL
PATIENT BOLUS: NORMAL
PATIENT HEPARIN CONCENTRATION: 0
PATIENT HEPARIN CONCENTRATION: 2
PCO2: 29 MMHG (ref 35–45)
PCO2: 36 MMHG (ref 35–45)
PCO2: 36 MMHG (ref 35–45)
PCO2: 40 MMHG (ref 35–45)
PCO2: 40 MMHG (ref 35–45)
PCO2: 41 MMHG (ref 35–45)
PCO2: 41 MMHG (ref 35–45)
PCO2: 42 MMHG (ref 35–45)
PCO2: 42 MMHG (ref 35–45)
PCO2: 43 MMHG (ref 35–45)
PCO2: 44 MMHG (ref 35–45)
PCO2: 81 MMHG (ref 35–45)
PDW BLD-RTO: 13.8 % (ref 11.5–14.5)
PDW BLD-RTO: 14.1 % (ref 11.5–14.5)
PDW BLD-RTO: 14.2 % (ref 11.5–14.5)
PDW BLD-RTO: 14.2 % (ref 11.5–14.5)
PDW BLD-RTO: 14.5 % (ref 11.5–14.5)
PDW BLD-RTO: 14.7 % (ref 11.5–14.5)
PDW BLD-RTO: 14.8 % (ref 11.5–14.5)
PDW BLD-RTO: 14.8 % (ref 11.5–14.5)
PDW BLD-RTO: 15.2 % (ref 11.5–14.5)
PDW BLD-RTO: 15.3 % (ref 11.5–14.5)
PDW BLD-RTO: 15.5 % (ref 11.5–14.5)
PDW BLD-RTO: 15.7 % (ref 11.5–14.5)
PDW BLD-RTO: 16 % (ref 11.5–14.5)
PDW BLD-RTO: 16.1 % (ref 11.5–14.5)
PDW BLD-RTO: 16.3 % (ref 11.5–14.5)
PDW BLD-RTO: 16.8 % (ref 11.5–14.5)
PDW BLD-RTO: 17.2 % (ref 11.5–14.5)
PH BLOOD GAS: 7.34 (ref 7.35–7.45)
PH BLOOD GAS: 7.35 (ref 7.35–7.45)
PH BLOOD GAS: 7.36 (ref 7.35–7.45)
PH BLOOD GAS: 7.36 (ref 7.35–7.45)
PH BLOOD GAS: 7.39 (ref 7.35–7.45)
PH BLOOD GAS: 7.39 (ref 7.35–7.45)
PH BLOOD GAS: 7.4 (ref 7.35–7.45)
PH BLOOD GAS: 7.41 (ref 7.35–7.45)
PH BLOOD GAS: 7.44 (ref 7.35–7.45)
PH UA: 5
PH UA: 5.5
PH UA: 5.5
PHOSPHORUS: 2.5 MG/DL (ref 2.4–4.7)
PLATELET # BLD: 141 THOU/MM3 (ref 130–400)
PLATELET # BLD: 154 THOU/MM3 (ref 130–400)
PLATELET # BLD: 160 THOU/MM3 (ref 130–400)
PLATELET # BLD: 161 THOU/MM3 (ref 130–400)
PLATELET # BLD: 167 THOU/MM3 (ref 130–400)
PLATELET # BLD: 222 THOU/MM3 (ref 130–400)
PLATELET # BLD: 226 THOU/MM3 (ref 130–400)
PLATELET # BLD: 277 THOU/MM3 (ref 130–400)
PLATELET # BLD: 288 THOU/MM3 (ref 130–400)
PLATELET # BLD: 288 THOU/MM3 (ref 130–400)
PLATELET # BLD: 298 THOU/MM3 (ref 130–400)
PLATELET # BLD: 301 THOU/MM3 (ref 130–400)
PLATELET # BLD: 313 THOU/MM3 (ref 130–400)
PLATELET # BLD: 314 THOU/MM3 (ref 130–400)
PLATELET # BLD: 318 THOU/MM3 (ref 130–400)
PLATELET # BLD: 319 THOU/MM3 (ref 130–400)
PLATELET # BLD: 324 THOU/MM3 (ref 130–400)
PLATELET # BLD: 337 THOU/MM3 (ref 130–400)
PLATELET # BLD: 370 THOU/MM3 (ref 130–400)
PMV BLD AUTO: 7.5 FL (ref 7.4–10.4)
PMV BLD AUTO: 7.6 FL (ref 7.4–10.4)
PMV BLD AUTO: 7.7 FL (ref 7.4–10.4)
PMV BLD AUTO: 7.8 FL (ref 7.4–10.4)
PMV BLD AUTO: 8.1 FL (ref 7.4–10.4)
PMV BLD AUTO: 8.2 FL (ref 7.4–10.4)
PMV BLD AUTO: 8.3 FL (ref 7.4–10.4)
PMV BLD AUTO: 8.3 FL (ref 7.4–10.4)
PMV BLD AUTO: 8.6 FL (ref 7.4–10.4)
PMV BLD AUTO: 8.7 FL (ref 7.4–10.4)
PMV BLD AUTO: 8.9 FL (ref 7.4–10.4)
PMV BLD AUTO: 9 FL (ref 7.4–10.4)
PMV BLD AUTO: 9.3 FL (ref 7.4–10.4)
PO2: 114 MMHG (ref 71–104)
PO2: 224 MMHG (ref 71–104)
PO2: 299 MMHG (ref 71–104)
PO2: 334 MMHG (ref 71–104)
PO2: 339 MMHG (ref 71–104)
PO2: 358 MMHG (ref 71–104)
PO2: 370 MMHG (ref 71–104)
PO2: 44 MMHG (ref 71–104)
PO2: 64 MMHG (ref 71–104)
PO2: 75 MMHG (ref 71–104)
PO2: 87 MMHG (ref 71–104)
PO2: 97 MMHG (ref 71–104)
POTASSIUM REFLEX MAGNESIUM: 3.9 MEQ/L (ref 3.5–5.2)
POTASSIUM REFLEX MAGNESIUM: 3.9 MEQ/L (ref 3.5–5.2)
POTASSIUM REFLEX MAGNESIUM: 4 MEQ/L (ref 3.5–5.2)
POTASSIUM REFLEX MAGNESIUM: 4.2 MEQ/L (ref 3.5–5.2)
POTASSIUM REFLEX MAGNESIUM: 4.3 MEQ/L (ref 3.5–5.2)
POTASSIUM REFLEX MAGNESIUM: 4.3 MEQ/L (ref 3.5–5.2)
POTASSIUM REFLEX MAGNESIUM: 4.4 MEQ/L (ref 3.5–5.2)
POTASSIUM REFLEX MAGNESIUM: 4.6 MEQ/L (ref 3.5–5.2)
POTASSIUM REFLEX MAGNESIUM: 4.9 MEQ/L (ref 3.5–5.2)
POTASSIUM SERPL-SCNC: 4.3 MEQ/L (ref 3.5–5.2)
POTASSIUM SERPL-SCNC: 4.7 MEQ/L (ref 3.5–5.2)
POTASSIUM SERPL-SCNC: 4.9 MEQ/L (ref 3.5–5.2)
POTASSIUM, WHOLE BLOOD: 3.8 MEQ/L (ref 3.5–4.9)
POTASSIUM, WHOLE BLOOD: 4 MEQ/L (ref 3.5–4.9)
POTASSIUM, WHOLE BLOOD: 4.2 MEQ/L (ref 3.5–4.9)
POTASSIUM, WHOLE BLOOD: 4.2 MEQ/L (ref 3.5–4.9)
POTASSIUM, WHOLE BLOOD: 4.3 MEQ/L (ref 3.5–4.9)
PROJECTED HEPARIN CONCENTATION: 2
PROTEIN UA: 100 MG/DL
PROTEIN UA: 30 MG/DL
PROTEIN UA: NEGATIVE MG/DL
RANGE: NORMAL
RANGE: NORMAL
RBC # BLD: 2.24 MILL/MM3 (ref 4.2–5.4)
RBC # BLD: 2.45 MILL/MM3 (ref 4.2–5.4)
RBC # BLD: 2.49 MILL/MM3 (ref 4.2–5.4)
RBC # BLD: 2.54 MILL/MM3 (ref 4.2–5.4)
RBC # BLD: 2.73 MILL/MM3 (ref 4.2–5.4)
RBC # BLD: 2.82 MILL/MM3 (ref 4.2–5.4)
RBC # BLD: 2.83 MILL/MM3 (ref 4.2–5.4)
RBC # BLD: 2.87 MILL/MM3 (ref 4.2–5.4)
RBC # BLD: 2.91 MILL/MM3 (ref 4.2–5.4)
RBC # BLD: 3.04 MILL/MM3 (ref 4.2–5.4)
RBC # BLD: 3.08 MILL/MM3 (ref 4.2–5.4)
RBC # BLD: 3.14 MILL/MM3 (ref 4.2–5.4)
RBC # BLD: 3.14 MILL/MM3 (ref 4.2–5.4)
RBC # BLD: 4.23 MILL/MM3 (ref 4.2–5.4)
RBC # BLD: 4.48 MILL/MM3 (ref 4.2–5.4)
RBC # BLD: 4.56 MILL/MM3 (ref 4.2–5.4)
RBC # BLD: 4.79 MILL/MM3 (ref 4.2–5.4)
RBC URINE: ABNORMAL /HPF
RBC URINE: NORMAL /HPF
RENAL EPITHELIAL, UA: ABNORMAL
RENAL EPITHELIAL, UA: NORMAL
RH FACTOR: NORMAL
SET PEEP: 5 MMHG
SET PEEP: 5 MMHG
SET PRESS SUPP: 8 CMH2O
SET PRESS SUPP: 8 CMH2O
SET RESPIRATORY RATE: 16 BPM
SET RESPIRATORY RATE: 8 BPM
SET TIDAL VOLUME: 480 ML
SET TIDAL VOLUME: 480 ML
SODIUM BLD-SCNC: 136 MEQ/L (ref 135–145)
SODIUM BLD-SCNC: 137 MEQ/L (ref 135–145)
SODIUM BLD-SCNC: 137 MEQ/L (ref 135–145)
SODIUM BLD-SCNC: 138 MEQ/L (ref 135–145)
SODIUM BLD-SCNC: 139 MEQ/L (ref 135–145)
SODIUM BLD-SCNC: 140 MEQ/L (ref 135–145)
SODIUM BLD-SCNC: 140 MEQ/L (ref 135–145)
SODIUM BLD-SCNC: 141 MEQ/L (ref 135–145)
SODIUM BLD-SCNC: 141 MEQ/L (ref 135–145)
SODIUM BLD-SCNC: 142 MEQ/L (ref 135–145)
SODIUM BLD-SCNC: 142 MEQ/L (ref 135–145)
SODIUM BLD-SCNC: 146 MEQ/L (ref 135–145)
SODIUM BLD-SCNC: 147 MEQ/L (ref 135–145)
SODIUM, WHOLE BLOOD: 144 MEQ/L (ref 138–146)
SODIUM, WHOLE BLOOD: 145 MEQ/L (ref 138–146)
SODIUM, WHOLE BLOOD: 147 MEQ/L (ref 138–146)
SODIUM, WHOLE BLOOD: 148 MEQ/L (ref 138–146)
SODIUM, WHOLE BLOOD: 148 MEQ/L (ref 138–146)
SOURCE, BLOOD GAS: ABNORMAL
SOURCE, BLOOD GAS: NORMAL
SOURCE, BLOOD GAS: NORMAL
SPECIFIC GRAVITY UA: 1.01 (ref 1–1.03)
SPECIFIC GRAVITY UA: 1.02 (ref 1–1.03)
SPECIFIC GRAVITY UA: 1.03 (ref 1–1.03)
SPECIFIC GRAVITY UA: > 1.03 (ref 1–1.03)
SPECIFIC GRAVITY UA: > 1.03 (ref 1–1.03)
T4 FREE: 2 NG/DL (ref 0.93–1.76)
TRIGL SERPL-MCNC: 129 MG/DL (ref 0–199)
TRIGL SERPL-MCNC: 94 MG/DL (ref 0–199)
TROPONIN T: 1.74 NG/ML
TROPONIN T: 3.68 NG/ML
TSH SERPL DL<=0.05 MIU/L-ACNC: 0.01 UIU/ML (ref 0.4–4.2)
URINE CULTURE, ROUTINE: ABNORMAL
URINE CULTURE, ROUTINE: ABNORMAL
URINE CULTURE, ROUTINE: NORMAL
UROBILINOGEN, URINE: 0.2 EU/DL
VRE CULTURE: NORMAL
WBC # BLD: 12 THOU/MM3 (ref 4.8–10.8)
WBC # BLD: 13.5 THOU/MM3 (ref 4.8–10.8)
WBC # BLD: 14.6 THOU/MM3 (ref 4.8–10.8)
WBC # BLD: 14.7 THOU/MM3 (ref 4.8–10.8)
WBC # BLD: 15.9 THOU/MM3 (ref 4.8–10.8)
WBC # BLD: 18.7 THOU/MM3 (ref 4.8–10.8)
WBC # BLD: 18.8 THOU/MM3 (ref 4.8–10.8)
WBC # BLD: 18.9 THOU/MM3 (ref 4.8–10.8)
WBC # BLD: 20.9 THOU/MM3 (ref 4.8–10.8)
WBC # BLD: 22.2 THOU/MM3 (ref 4.8–10.8)
WBC # BLD: 22.4 THOU/MM3 (ref 4.8–10.8)
WBC # BLD: 23.5 THOU/MM3 (ref 4.8–10.8)
WBC # BLD: 25.4 THOU/MM3 (ref 4.8–10.8)
WBC # BLD: 27.4 THOU/MM3 (ref 4.8–10.8)
WBC # BLD: 28.5 THOU/MM3 (ref 4.8–10.8)
WBC # BLD: 9.8 THOU/MM3 (ref 4.8–10.8)
WBC # BLD: 9.9 THOU/MM3 (ref 4.8–10.8)
WBC UA: ABNORMAL /HPF
WBC UA: NORMAL /HPF
YEAST: ABNORMAL
YEAST: NORMAL
YEAST: PRESENT

## 2018-01-01 PROCEDURE — A6258 TRANSPARENT FILM >16<=48 IN: HCPCS

## 2018-01-01 PROCEDURE — 92941 PRQ TRLML REVSC TOT OCCL AMI: CPT | Performed by: INTERNAL MEDICINE

## 2018-01-01 PROCEDURE — 6370000000 HC RX 637 (ALT 250 FOR IP)

## 2018-01-01 PROCEDURE — G8978 MOBILITY CURRENT STATUS: HCPCS

## 2018-01-01 PROCEDURE — 2580000003 HC RX 258

## 2018-01-01 PROCEDURE — 36415 COLL VENOUS BLD VENIPUNCTURE: CPT

## 2018-01-01 PROCEDURE — S0028 INJECTION, FAMOTIDINE, 20 MG: HCPCS | Performed by: THORACIC SURGERY (CARDIOTHORACIC VASCULAR SURGERY)

## 2018-01-01 PROCEDURE — B2111ZZ FLUOROSCOPY OF MULTIPLE CORONARY ARTERIES USING LOW OSMOLAR CONTRAST: ICD-10-PCS | Performed by: INTERNAL MEDICINE

## 2018-01-01 PROCEDURE — 6360000002 HC RX W HCPCS

## 2018-01-01 PROCEDURE — 97110 THERAPEUTIC EXERCISES: CPT

## 2018-01-01 PROCEDURE — 2060000000 HC ICU INTERMEDIATE R&B

## 2018-01-01 PROCEDURE — 85027 COMPLETE CBC AUTOMATED: CPT

## 2018-01-01 PROCEDURE — 97116 GAIT TRAINING THERAPY: CPT

## 2018-01-01 PROCEDURE — 99223 1ST HOSP IP/OBS HIGH 75: CPT | Performed by: INTERNAL MEDICINE

## 2018-01-01 PROCEDURE — 71045 X-RAY EXAM CHEST 1 VIEW: CPT

## 2018-01-01 PROCEDURE — 86900 BLOOD TYPING SEROLOGIC ABO: CPT

## 2018-01-01 PROCEDURE — 027034Z DILATION OF CORONARY ARTERY, ONE ARTERY WITH DRUG-ELUTING INTRALUMINAL DEVICE, PERCUTANEOUS APPROACH: ICD-10-PCS | Performed by: INTERNAL MEDICINE

## 2018-01-01 PROCEDURE — 99024 POSTOP FOLLOW-UP VISIT: CPT | Performed by: THORACIC SURGERY (CARDIOTHORACIC VASCULAR SURGERY)

## 2018-01-01 PROCEDURE — 2580000003 HC RX 258: Performed by: THORACIC SURGERY (CARDIOTHORACIC VASCULAR SURGERY)

## 2018-01-01 PROCEDURE — 93005 ELECTROCARDIOGRAM TRACING: CPT | Performed by: INTERNAL MEDICINE

## 2018-01-01 PROCEDURE — 97530 THERAPEUTIC ACTIVITIES: CPT

## 2018-01-01 PROCEDURE — 85730 THROMBOPLASTIN TIME PARTIAL: CPT

## 2018-01-01 PROCEDURE — 94660 CPAP INITIATION&MGMT: CPT

## 2018-01-01 PROCEDURE — 71046 X-RAY EXAM CHEST 2 VIEWS: CPT

## 2018-01-01 PROCEDURE — 2580000003 HC RX 258: Performed by: INTERNAL MEDICINE

## 2018-01-01 PROCEDURE — 4A023N7 MEASUREMENT OF CARDIAC SAMPLING AND PRESSURE, LEFT HEART, PERCUTANEOUS APPROACH: ICD-10-PCS | Performed by: INTERNAL MEDICINE

## 2018-01-01 PROCEDURE — 93005 ELECTROCARDIOGRAM TRACING: CPT | Performed by: THORACIC SURGERY (CARDIOTHORACIC VASCULAR SURGERY)

## 2018-01-01 PROCEDURE — C9600 PERC DRUG-EL COR STENT SING: HCPCS | Performed by: INTERNAL MEDICINE

## 2018-01-01 PROCEDURE — 2580000003 HC RX 258: Performed by: PHYSICIAN ASSISTANT

## 2018-01-01 PROCEDURE — P9045 ALBUMIN (HUMAN), 5%, 250 ML: HCPCS | Performed by: THORACIC SURGERY (CARDIOTHORACIC VASCULAR SURGERY)

## 2018-01-01 PROCEDURE — 6370000000 HC RX 637 (ALT 250 FOR IP): Performed by: INTERNAL MEDICINE

## 2018-01-01 PROCEDURE — 93308 TTE F-UP OR LMTD: CPT

## 2018-01-01 PROCEDURE — 6370000000 HC RX 637 (ALT 250 FOR IP): Performed by: THORACIC SURGERY (CARDIOTHORACIC VASCULAR SURGERY)

## 2018-01-01 PROCEDURE — 97535 SELF CARE MNGMENT TRAINING: CPT

## 2018-01-01 PROCEDURE — 6360000002 HC RX W HCPCS: Performed by: THORACIC SURGERY (CARDIOTHORACIC VASCULAR SURGERY)

## 2018-01-01 PROCEDURE — 33990 INSJ PERQ VAD L HRT ARTERIAL: CPT | Performed by: INTERNAL MEDICINE

## 2018-01-01 PROCEDURE — 81001 URINALYSIS AUTO W/SCOPE: CPT

## 2018-01-01 PROCEDURE — 84295 ASSAY OF SERUM SODIUM: CPT

## 2018-01-01 PROCEDURE — 5A0221D ASSISTANCE WITH CARDIAC OUTPUT USING IMPELLER PUMP, CONTINUOUS: ICD-10-PCS | Performed by: INTERNAL MEDICINE

## 2018-01-01 PROCEDURE — C1894 INTRO/SHEATH, NON-LASER: HCPCS

## 2018-01-01 PROCEDURE — 37799 UNLISTED PX VASCULAR SURGERY: CPT

## 2018-01-01 PROCEDURE — 82948 REAGENT STRIP/BLOOD GLUCOSE: CPT

## 2018-01-01 PROCEDURE — 82330 ASSAY OF CALCIUM: CPT

## 2018-01-01 PROCEDURE — 2780000010 HC IMPLANT OTHER

## 2018-01-01 PROCEDURE — C1769 GUIDE WIRE: HCPCS

## 2018-01-01 PROCEDURE — 87493 C DIFF AMPLIFIED PROBE: CPT

## 2018-01-01 PROCEDURE — G8987 SELF CARE CURRENT STATUS: HCPCS

## 2018-01-01 PROCEDURE — 80048 BASIC METABOLIC PNL TOTAL CA: CPT

## 2018-01-01 PROCEDURE — 84132 ASSAY OF SERUM POTASSIUM: CPT

## 2018-01-01 PROCEDURE — 87641 MR-STAPH DNA AMP PROBE: CPT

## 2018-01-01 PROCEDURE — 85610 PROTHROMBIN TIME: CPT

## 2018-01-01 PROCEDURE — 2500000003 HC RX 250 WO HCPCS: Performed by: THORACIC SURGERY (CARDIOTHORACIC VASCULAR SURGERY)

## 2018-01-01 PROCEDURE — 87077 CULTURE AEROBIC IDENTIFY: CPT

## 2018-01-01 PROCEDURE — P9016 RBC LEUKOCYTES REDUCED: HCPCS

## 2018-01-01 PROCEDURE — 99232 SBSQ HOSP IP/OBS MODERATE 35: CPT | Performed by: PHYSICIAN ASSISTANT

## 2018-01-01 PROCEDURE — 86850 RBC ANTIBODY SCREEN: CPT

## 2018-01-01 PROCEDURE — 2700000000 HC OXYGEN THERAPY PER DAY

## 2018-01-01 PROCEDURE — B245ZZ4 ULTRASONOGRAPHY OF LEFT HEART, TRANSESOPHAGEAL: ICD-10-PCS | Performed by: THORACIC SURGERY (CARDIOTHORACIC VASCULAR SURGERY)

## 2018-01-01 PROCEDURE — 82803 BLOOD GASES ANY COMBINATION: CPT

## 2018-01-01 PROCEDURE — 96361 HYDRATE IV INFUSION ADD-ON: CPT

## 2018-01-01 PROCEDURE — C1773 RET DEV, INSERTABLE: HCPCS

## 2018-01-01 PROCEDURE — C1760 CLOSURE DEV, VASC: HCPCS

## 2018-01-01 PROCEDURE — 92953 TEMPORARY EXTERNAL PACING: CPT

## 2018-01-01 PROCEDURE — 86901 BLOOD TYPING SEROLOGIC RH(D): CPT

## 2018-01-01 PROCEDURE — 6360000002 HC RX W HCPCS: Performed by: INTERNAL MEDICINE

## 2018-01-01 PROCEDURE — 85018 HEMOGLOBIN: CPT

## 2018-01-01 PROCEDURE — 3600000018 HC SURGERY OHS ADDTL 15MIN: Performed by: THORACIC SURGERY (CARDIOTHORACIC VASCULAR SURGERY)

## 2018-01-01 PROCEDURE — 2500000003 HC RX 250 WO HCPCS

## 2018-01-01 PROCEDURE — 3700000000 HC ANESTHESIA ATTENDED CARE: Performed by: THORACIC SURGERY (CARDIOTHORACIC VASCULAR SURGERY)

## 2018-01-01 PROCEDURE — 33517 CABG ARTERY-VEIN SINGLE: CPT | Performed by: THORACIC SURGERY (CARDIOTHORACIC VASCULAR SURGERY)

## 2018-01-01 PROCEDURE — 2500000003 HC RX 250 WO HCPCS: Performed by: INTERNAL MEDICINE

## 2018-01-01 PROCEDURE — 6360000002 HC RX W HCPCS: Performed by: ANESTHESIOLOGY

## 2018-01-01 PROCEDURE — P9045 ALBUMIN (HUMAN), 5%, 250 ML: HCPCS

## 2018-01-01 PROCEDURE — 85520 HEPARIN ASSAY: CPT

## 2018-01-01 PROCEDURE — 0BH17EZ INSERTION OF ENDOTRACHEAL AIRWAY INTO TRACHEA, VIA NATURAL OR ARTIFICIAL OPENING: ICD-10-PCS | Performed by: INTERNAL MEDICINE

## 2018-01-01 PROCEDURE — 87086 URINE CULTURE/COLONY COUNT: CPT

## 2018-01-01 PROCEDURE — 3700000001 HC ADD 15 MINUTES (ANESTHESIA): Performed by: THORACIC SURGERY (CARDIOTHORACIC VASCULAR SURGERY)

## 2018-01-01 PROCEDURE — 83735 ASSAY OF MAGNESIUM: CPT

## 2018-01-01 PROCEDURE — 93459 L HRT ART/GRFT ANGIO: CPT | Performed by: INTERNAL MEDICINE

## 2018-01-01 PROCEDURE — 31500 INSERT EMERGENCY AIRWAY: CPT | Performed by: ANESTHESIOLOGY

## 2018-01-01 PROCEDURE — 99238 HOSP IP/OBS DSCHRG MGMT 30/<: CPT | Performed by: INTERNAL MEDICINE

## 2018-01-01 PROCEDURE — 93010 ELECTROCARDIOGRAM REPORT: CPT | Performed by: INTERNAL MEDICINE

## 2018-01-01 PROCEDURE — 33508 ENDOSCOPIC VEIN HARVEST: CPT | Performed by: THORACIC SURGERY (CARDIOTHORACIC VASCULAR SURGERY)

## 2018-01-01 PROCEDURE — 2720000010 HC SURG SUPPLY STERILE

## 2018-01-01 PROCEDURE — 2500000003 HC RX 250 WO HCPCS: Performed by: ANESTHESIOLOGY

## 2018-01-01 PROCEDURE — 94002 VENT MGMT INPAT INIT DAY: CPT

## 2018-01-01 PROCEDURE — 97165 OT EVAL LOW COMPLEX 30 MIN: CPT

## 2018-01-01 PROCEDURE — C1725 CATH, TRANSLUMIN NON-LASER: HCPCS

## 2018-01-01 PROCEDURE — 2580000003 HC RX 258: Performed by: ANESTHESIOLOGY

## 2018-01-01 PROCEDURE — C1887 CATHETER, GUIDING: HCPCS

## 2018-01-01 PROCEDURE — 83036 HEMOGLOBIN GLYCOSYLATED A1C: CPT

## 2018-01-01 PROCEDURE — 80061 LIPID PANEL: CPT

## 2018-01-01 PROCEDURE — 33533 CABG ARTERIAL SINGLE: CPT | Performed by: THORACIC SURGERY (CARDIOTHORACIC VASCULAR SURGERY)

## 2018-01-01 PROCEDURE — 2720000010 HC SURG SUPPLY STERILE: Performed by: THORACIC SURGERY (CARDIOTHORACIC VASCULAR SURGERY)

## 2018-01-01 PROCEDURE — 93458 L HRT ARTERY/VENTRICLE ANGIO: CPT | Performed by: INTERNAL MEDICINE

## 2018-01-01 PROCEDURE — 84100 ASSAY OF PHOSPHORUS: CPT

## 2018-01-01 PROCEDURE — 1200000000 HC SEMI PRIVATE

## 2018-01-01 PROCEDURE — 85014 HEMATOCRIT: CPT

## 2018-01-01 PROCEDURE — G8988 SELF CARE GOAL STATUS: HCPCS

## 2018-01-01 PROCEDURE — 99220 PR INITIAL OBSERVATION CARE/DAY 70 MINUTES: CPT | Performed by: INTERNAL MEDICINE

## 2018-01-01 PROCEDURE — 93005 ELECTROCARDIOGRAM TRACING: CPT | Performed by: PHYSICIAN ASSISTANT

## 2018-01-01 PROCEDURE — 84484 ASSAY OF TROPONIN QUANT: CPT

## 2018-01-01 PROCEDURE — 87186 SC STD MICRODIL/AGAR DIL: CPT

## 2018-01-01 PROCEDURE — 5A1213Z PERFORMANCE OF CARDIAC PACING, INTERMITTENT: ICD-10-PCS | Performed by: INTERNAL MEDICINE

## 2018-01-01 PROCEDURE — 86923 COMPATIBILITY TEST ELECTRIC: CPT

## 2018-01-01 PROCEDURE — C1756 CATH, PACING, TRANSESOPH: HCPCS

## 2018-01-01 PROCEDURE — 02100Z9 BYPASS CORONARY ARTERY, ONE ARTERY FROM LEFT INTERNAL MAMMARY, OPEN APPROACH: ICD-10-PCS | Performed by: THORACIC SURGERY (CARDIOTHORACIC VASCULAR SURGERY)

## 2018-01-01 PROCEDURE — 87081 CULTURE SCREEN ONLY: CPT

## 2018-01-01 PROCEDURE — 5A1221Z PERFORMANCE OF CARDIAC OUTPUT, CONTINUOUS: ICD-10-PCS | Performed by: THORACIC SURGERY (CARDIOTHORACIC VASCULAR SURGERY)

## 2018-01-01 PROCEDURE — 2000000000 HC ICU R&B

## 2018-01-01 PROCEDURE — 93971 EXTREMITY STUDY: CPT

## 2018-01-01 PROCEDURE — 84443 ASSAY THYROID STIM HORMONE: CPT

## 2018-01-01 PROCEDURE — P9045 ALBUMIN (HUMAN), 5%, 250 ML: HCPCS | Performed by: ANESTHESIOLOGY

## 2018-01-01 PROCEDURE — 021009W BYPASS CORONARY ARTERY, ONE ARTERY FROM AORTA WITH AUTOLOGOUS VENOUS TISSUE, OPEN APPROACH: ICD-10-PCS | Performed by: THORACIC SURGERY (CARDIOTHORACIC VASCULAR SURGERY)

## 2018-01-01 PROCEDURE — 06BP4ZZ EXCISION OF RIGHT SAPHENOUS VEIN, PERCUTANEOUS ENDOSCOPIC APPROACH: ICD-10-PCS | Performed by: THORACIC SURGERY (CARDIOTHORACIC VASCULAR SURGERY)

## 2018-01-01 PROCEDURE — 87184 SC STD DISK METHOD PER PLATE: CPT

## 2018-01-01 PROCEDURE — G8979 MOBILITY GOAL STATUS: HCPCS

## 2018-01-01 PROCEDURE — C1874 STENT, COATED/COV W/DEL SYS: HCPCS

## 2018-01-01 PROCEDURE — 36600 WITHDRAWAL OF ARTERIAL BLOOD: CPT

## 2018-01-01 PROCEDURE — 85385 FIBRINOGEN ANTIGEN: CPT

## 2018-01-01 PROCEDURE — 84439 ASSAY OF FREE THYROXINE: CPT

## 2018-01-01 PROCEDURE — B2181ZZ FLUOROSCOPY OF LEFT INTERNAL MAMMARY BYPASS GRAFT USING LOW OSMOLAR CONTRAST: ICD-10-PCS | Performed by: INTERNAL MEDICINE

## 2018-01-01 PROCEDURE — 97163 PT EVAL HIGH COMPLEX 45 MIN: CPT

## 2018-01-01 PROCEDURE — 82947 ASSAY GLUCOSE BLOOD QUANT: CPT

## 2018-01-01 PROCEDURE — 02HA3RZ INSERTION OF SHORT-TERM EXTERNAL HEART ASSIST SYSTEM INTO HEART, PERCUTANEOUS APPROACH: ICD-10-PCS | Performed by: INTERNAL MEDICINE

## 2018-01-01 PROCEDURE — 36430 TRANSFUSION BLD/BLD COMPNT: CPT

## 2018-01-01 PROCEDURE — 6370000000 HC RX 637 (ALT 250 FOR IP): Performed by: PHYSICIAN ASSISTANT

## 2018-01-01 PROCEDURE — 96360 HYDRATION IV INFUSION INIT: CPT

## 2018-01-01 PROCEDURE — 3600000008 HC SURGERY OHS BASE: Performed by: THORACIC SURGERY (CARDIOTHORACIC VASCULAR SURGERY)

## 2018-01-01 PROCEDURE — 36592 COLLECT BLOOD FROM PICC: CPT

## 2018-01-01 RX ORDER — ASPIRIN 81 MG/1
324 TABLET, CHEWABLE ORAL ONCE
Status: CANCELLED | OUTPATIENT
Start: 2018-01-01 | End: 2018-01-01

## 2018-01-01 RX ORDER — SODIUM CHLORIDE 0.9 % (FLUSH) 0.9 %
10 SYRINGE (ML) INJECTION EVERY 12 HOURS SCHEDULED
Status: CANCELLED | OUTPATIENT
Start: 2018-01-01

## 2018-01-01 RX ORDER — SODIUM CHLORIDE 9 MG/ML
INJECTION, SOLUTION INTRAVENOUS CONTINUOUS
Status: CANCELLED | OUTPATIENT
Start: 2018-01-01

## 2018-01-01 RX ORDER — AMLODIPINE BESYLATE 10 MG/1
10 TABLET ORAL DAILY
Status: DISCONTINUED | OUTPATIENT
Start: 2018-01-01 | End: 2018-01-01 | Stop reason: HOSPADM

## 2018-01-01 RX ORDER — POTASSIUM CHLORIDE 7.45 MG/ML
10 INJECTION INTRAVENOUS PRN
Status: DISCONTINUED | OUTPATIENT
Start: 2018-01-01 | End: 2018-01-01 | Stop reason: HOSPADM

## 2018-01-01 RX ORDER — SODIUM CHLORIDE 9 MG/ML
INJECTION, SOLUTION INTRAVENOUS CONTINUOUS PRN
Status: DISCONTINUED | OUTPATIENT
Start: 2018-01-01 | End: 2018-01-01 | Stop reason: SDUPTHER

## 2018-01-01 RX ORDER — SODIUM CHLORIDE 0.9 % (FLUSH) 0.9 %
10 SYRINGE (ML) INJECTION PRN
Status: CANCELLED | OUTPATIENT
Start: 2018-01-01

## 2018-01-01 RX ORDER — AMIODARONE HYDROCHLORIDE 200 MG/1
200 TABLET ORAL 3 TIMES DAILY
Status: DISCONTINUED | OUTPATIENT
Start: 2018-01-01 | End: 2018-01-01

## 2018-01-01 RX ORDER — DEXTROSE AND SODIUM CHLORIDE 5; .9 G/100ML; G/100ML
INJECTION, SOLUTION INTRAVENOUS PRN
Status: DISCONTINUED | OUTPATIENT
Start: 2018-01-01 | End: 2018-01-01

## 2018-01-01 RX ORDER — HEPARIN SODIUM 1000 [USP'U]/ML
INJECTION, SOLUTION INTRAVENOUS; SUBCUTANEOUS PRN
Status: DISCONTINUED | OUTPATIENT
Start: 2018-01-01 | End: 2018-01-01 | Stop reason: SDUPTHER

## 2018-01-01 RX ORDER — CLOPIDOGREL BISULFATE 75 MG/1
75 TABLET ORAL DAILY
Status: DISCONTINUED | OUTPATIENT
Start: 2018-01-01 | End: 2018-01-01 | Stop reason: HOSPADM

## 2018-01-01 RX ORDER — AMIODARONE HYDROCHLORIDE 200 MG/1
200 TABLET ORAL DAILY
Status: DISCONTINUED | OUTPATIENT
Start: 2018-01-01 | End: 2018-01-01

## 2018-01-01 RX ORDER — ALBUMIN, HUMAN INJ 5% 5 %
25 SOLUTION INTRAVENOUS PRN
Status: DISCONTINUED | OUTPATIENT
Start: 2018-01-01 | End: 2018-01-01

## 2018-01-01 RX ORDER — SODIUM CHLORIDE 0.9 % (FLUSH) 0.9 %
10 SYRINGE (ML) INJECTION EVERY 12 HOURS SCHEDULED
Status: DISCONTINUED | OUTPATIENT
Start: 2018-01-01 | End: 2018-01-01 | Stop reason: HOSPADM

## 2018-01-01 RX ORDER — AMIODARONE HYDROCHLORIDE 200 MG/1
200 TABLET ORAL ONCE
Status: CANCELLED | OUTPATIENT
Start: 2018-01-01 | End: 2018-01-01

## 2018-01-01 RX ORDER — PAPAVERINE HYDROCHLORIDE 30 MG/ML
INJECTION INTRAMUSCULAR; INTRAVENOUS PRN
Status: DISCONTINUED | OUTPATIENT
Start: 2018-01-01 | End: 2018-01-01 | Stop reason: HOSPADM

## 2018-01-01 RX ORDER — ACETAMINOPHEN 325 MG/1
650 TABLET ORAL EVERY 4 HOURS PRN
Status: DISCONTINUED | OUTPATIENT
Start: 2018-01-01 | End: 2018-01-01 | Stop reason: HOSPADM

## 2018-01-01 RX ORDER — POTASSIUM CHLORIDE 20MEQ/15ML
20 LIQUID (ML) ORAL DAILY
Qty: 450 ML | Refills: 0
Start: 2018-01-01 | End: 2018-01-01 | Stop reason: ALTCHOICE

## 2018-01-01 RX ORDER — FENTANYL CITRATE 50 UG/ML
INJECTION, SOLUTION INTRAMUSCULAR; INTRAVENOUS PRN
Status: DISCONTINUED | OUTPATIENT
Start: 2018-01-01 | End: 2018-01-01

## 2018-01-01 RX ORDER — METRONIDAZOLE 500 MG/1
500 TABLET ORAL EVERY 8 HOURS
Qty: 15 TABLET | Refills: 0
Start: 2018-01-01 | End: 2018-01-01

## 2018-01-01 RX ORDER — PANTOPRAZOLE SODIUM 40 MG/1
40 TABLET, DELAYED RELEASE ORAL
Status: DISCONTINUED | OUTPATIENT
Start: 2018-01-01 | End: 2018-01-01 | Stop reason: HOSPADM

## 2018-01-01 RX ORDER — NYSTATIN 100000 U/G
100000 CREAM TOPICAL 2 TIMES DAILY
COMMUNITY
End: 2018-01-01 | Stop reason: ALTCHOICE

## 2018-01-01 RX ORDER — MORPHINE SULFATE 2 MG/ML
2 INJECTION, SOLUTION INTRAMUSCULAR; INTRAVENOUS
Status: DISPENSED | OUTPATIENT
Start: 2018-01-01 | End: 2018-01-01

## 2018-01-01 RX ORDER — ALBUMIN, HUMAN INJ 5% 5 %
12.5 SOLUTION INTRAVENOUS ONCE
Status: COMPLETED | OUTPATIENT
Start: 2018-01-01 | End: 2018-01-01

## 2018-01-01 RX ORDER — ALBUMIN, HUMAN INJ 5% 5 %
SOLUTION INTRAVENOUS
Status: COMPLETED
Start: 2018-01-01 | End: 2018-01-01

## 2018-01-01 RX ORDER — M-VIT,TX,IRON,MINS/CALC/FOLIC 27MG-0.4MG
1 TABLET ORAL
Qty: 1 TABLET | Refills: 0
Start: 2018-01-01

## 2018-01-01 RX ORDER — ASPIRIN 81 MG/1
81 TABLET ORAL DAILY
Status: CANCELLED | OUTPATIENT
Start: 2018-01-01

## 2018-01-01 RX ORDER — METOPROLOL TARTRATE 50 MG/1
50 TABLET, FILM COATED ORAL 2 TIMES DAILY
Status: DISCONTINUED | OUTPATIENT
Start: 2018-01-01 | End: 2018-01-01

## 2018-01-01 RX ORDER — SODIUM CHLORIDE 0.9 % (FLUSH) 0.9 %
10 SYRINGE (ML) INJECTION PRN
Status: DISCONTINUED | OUTPATIENT
Start: 2018-01-01 | End: 2018-01-01 | Stop reason: HOSPADM

## 2018-01-01 RX ORDER — LIDOCAINE HYDROCHLORIDE 10 MG/ML
5 INJECTION, SOLUTION EPIDURAL; INFILTRATION; INTRACAUDAL; PERINEURAL ONCE
Status: DISCONTINUED | OUTPATIENT
Start: 2018-01-01 | End: 2018-01-01 | Stop reason: HOSPADM

## 2018-01-01 RX ORDER — MIDAZOLAM HYDROCHLORIDE 1 MG/ML
INJECTION INTRAMUSCULAR; INTRAVENOUS PRN
Status: DISCONTINUED | OUTPATIENT
Start: 2018-01-01 | End: 2018-01-01 | Stop reason: SDUPTHER

## 2018-01-01 RX ORDER — BISACODYL 10 MG
10 SUPPOSITORY, RECTAL RECTAL DAILY PRN
Status: DISCONTINUED | OUTPATIENT
Start: 2018-01-01 | End: 2018-01-01 | Stop reason: HOSPADM

## 2018-01-01 RX ORDER — LEVOFLOXACIN 500 MG/1
500 TABLET, FILM COATED ORAL DAILY
COMMUNITY
Start: 2018-01-01 | End: 2018-01-01

## 2018-01-01 RX ORDER — FUROSEMIDE 40 MG/1
40 TABLET ORAL DAILY
Status: DISCONTINUED | OUTPATIENT
Start: 2018-01-01 | End: 2018-01-01 | Stop reason: HOSPADM

## 2018-01-01 RX ORDER — 0.9 % SODIUM CHLORIDE 0.9 %
250 INTRAVENOUS SOLUTION INTRAVENOUS ONCE
Status: COMPLETED | OUTPATIENT
Start: 2018-01-01 | End: 2018-01-01

## 2018-01-01 RX ORDER — NITROGLYCERIN 0.4 MG/1
0.4 TABLET SUBLINGUAL EVERY 5 MIN PRN
Status: DISCONTINUED | OUTPATIENT
Start: 2018-01-01 | End: 2018-01-01 | Stop reason: HOSPADM

## 2018-01-01 RX ORDER — FUROSEMIDE 40 MG/1
40 TABLET ORAL DAILY
Qty: 60 TABLET | Refills: 3
Start: 2018-01-01 | End: 2018-01-01 | Stop reason: ALTCHOICE

## 2018-01-01 RX ORDER — CHLORHEXIDINE GLUCONATE 4 G/100ML
SOLUTION TOPICAL ONCE
Status: DISCONTINUED | OUTPATIENT
Start: 2018-01-01 | End: 2018-01-01 | Stop reason: HOSPADM

## 2018-01-01 RX ORDER — ATORVASTATIN CALCIUM 40 MG/1
40 TABLET, FILM COATED ORAL NIGHTLY
Status: DISCONTINUED | OUTPATIENT
Start: 2018-01-01 | End: 2018-01-01 | Stop reason: HOSPADM

## 2018-01-01 RX ORDER — DIPHENHYDRAMINE HYDROCHLORIDE 50 MG/ML
50 INJECTION INTRAMUSCULAR; INTRAVENOUS ONCE
Status: COMPLETED | OUTPATIENT
Start: 2018-01-01 | End: 2018-01-01

## 2018-01-01 RX ORDER — AMIODARONE HYDROCHLORIDE 200 MG/1
200 TABLET ORAL 2 TIMES DAILY
Status: DISCONTINUED | OUTPATIENT
Start: 2018-01-01 | End: 2018-01-01 | Stop reason: HOSPADM

## 2018-01-01 RX ORDER — METOPROLOL SUCCINATE 25 MG/1
TABLET, EXTENDED RELEASE ORAL
COMMUNITY
Start: 2018-01-01 | End: 2018-01-01 | Stop reason: ALTCHOICE

## 2018-01-01 RX ORDER — SODIUM CHLORIDE 9 MG/ML
INJECTION, SOLUTION INTRAVENOUS CONTINUOUS
Status: DISCONTINUED | OUTPATIENT
Start: 2018-01-01 | End: 2018-01-01 | Stop reason: HOSPADM

## 2018-01-01 RX ORDER — ALBUTEROL SULFATE 90 UG/1
2 AEROSOL, METERED RESPIRATORY (INHALATION) EVERY 6 HOURS PRN
Status: DISCONTINUED | OUTPATIENT
Start: 2018-01-01 | End: 2018-01-01 | Stop reason: HOSPADM

## 2018-01-01 RX ORDER — ISOSORBIDE MONONITRATE 30 MG/1
30 TABLET, EXTENDED RELEASE ORAL DAILY
Status: ON HOLD | COMMUNITY
Start: 2018-01-01 | End: 2018-01-01 | Stop reason: ALTCHOICE

## 2018-01-01 RX ORDER — LISINOPRIL 2.5 MG/1
2.5 TABLET ORAL DAILY
Qty: 30 TABLET | Refills: 3
Start: 2018-01-01 | End: 2018-01-01 | Stop reason: ALTCHOICE

## 2018-01-01 RX ORDER — DEXTROSE MONOHYDRATE 25 G/50ML
12.5 INJECTION, SOLUTION INTRAVENOUS PRN
Status: DISCONTINUED | OUTPATIENT
Start: 2018-01-01 | End: 2018-01-01

## 2018-01-01 RX ORDER — METOPROLOL SUCCINATE 50 MG/1
50 TABLET, EXTENDED RELEASE ORAL DAILY
Status: DISCONTINUED | OUTPATIENT
Start: 2018-01-01 | End: 2018-01-01 | Stop reason: HOSPADM

## 2018-01-01 RX ORDER — METOPROLOL SUCCINATE 50 MG/1
50 TABLET, EXTENDED RELEASE ORAL 2 TIMES DAILY
Status: DISCONTINUED | OUTPATIENT
Start: 2018-01-01 | End: 2018-01-01 | Stop reason: HOSPADM

## 2018-01-01 RX ORDER — CHLORHEXIDINE GLUCONATE 4 G/100ML
SOLUTION TOPICAL ONCE
Status: COMPLETED | OUTPATIENT
Start: 2018-01-01 | End: 2018-01-01

## 2018-01-01 RX ORDER — ASPIRIN 81 MG/1
81 TABLET, CHEWABLE ORAL DAILY
Status: DISCONTINUED | OUTPATIENT
Start: 2018-01-01 | End: 2018-01-01 | Stop reason: HOSPADM

## 2018-01-01 RX ORDER — AMIODARONE HYDROCHLORIDE 200 MG/1
200 TABLET ORAL 2 TIMES DAILY
Status: DISCONTINUED | OUTPATIENT
Start: 2018-01-01 | End: 2018-01-01

## 2018-01-01 RX ORDER — CHLORHEXIDINE GLUCONATE 4 G/100ML
SOLUTION TOPICAL SEE ADMIN INSTRUCTIONS
Status: CANCELLED | OUTPATIENT
Start: 2018-01-01

## 2018-01-01 RX ORDER — ONDANSETRON 2 MG/ML
4 INJECTION INTRAMUSCULAR; INTRAVENOUS EVERY 6 HOURS PRN
Status: DISCONTINUED | OUTPATIENT
Start: 2018-01-01 | End: 2018-01-01 | Stop reason: HOSPADM

## 2018-01-01 RX ORDER — AMLODIPINE BESYLATE 10 MG/1
TABLET ORAL
COMMUNITY
Start: 2017-01-01 | End: 2018-01-01 | Stop reason: SDUPTHER

## 2018-01-01 RX ORDER — CEFUROXIME AXETIL 250 MG/1
500 TABLET ORAL 2 TIMES DAILY
Status: DISCONTINUED | OUTPATIENT
Start: 2018-01-01 | End: 2018-01-01

## 2018-01-01 RX ORDER — DIPHENHYDRAMINE HYDROCHLORIDE 50 MG/ML
25 INJECTION INTRAMUSCULAR; INTRAVENOUS ONCE
Status: CANCELLED | OUTPATIENT
Start: 2018-01-01 | End: 2018-01-01

## 2018-01-01 RX ORDER — POTASSIUM CHLORIDE 20 MEQ/1
40 TABLET, EXTENDED RELEASE ORAL PRN
Status: DISCONTINUED | OUTPATIENT
Start: 2018-01-01 | End: 2018-01-01 | Stop reason: HOSPADM

## 2018-01-01 RX ORDER — AMLODIPINE BESYLATE 10 MG/1
10 TABLET ORAL DAILY
Status: ON HOLD | COMMUNITY
End: 2018-01-01 | Stop reason: HOSPADM

## 2018-01-01 RX ORDER — METOPROLOL SUCCINATE 50 MG/1
50 TABLET, EXTENDED RELEASE ORAL 2 TIMES DAILY
COMMUNITY

## 2018-01-01 RX ORDER — NITROGLYCERIN 0.4 MG/1
0.4 TABLET SUBLINGUAL EVERY 5 MIN PRN
Status: CANCELLED | OUTPATIENT
Start: 2018-01-01

## 2018-01-01 RX ORDER — SODIUM CHLORIDE 9 MG/ML
INJECTION, SOLUTION INTRAVENOUS CONTINUOUS
Status: ACTIVE | OUTPATIENT
Start: 2018-01-01 | End: 2018-01-01

## 2018-01-01 RX ORDER — ALLOPURINOL 100 MG/1
100 TABLET ORAL DAILY
Status: DISCONTINUED | OUTPATIENT
Start: 2018-01-01 | End: 2018-01-01 | Stop reason: HOSPADM

## 2018-01-01 RX ORDER — LOPERAMIDE HYDROCHLORIDE 2 MG/1
2 CAPSULE ORAL 4 TIMES DAILY PRN
Status: DISCONTINUED | OUTPATIENT
Start: 2018-01-01 | End: 2018-01-01

## 2018-01-01 RX ORDER — SODIUM CHLORIDE 0.9 % (FLUSH) 0.9 %
10 SYRINGE (ML) INJECTION EVERY 12 HOURS SCHEDULED
Status: DISCONTINUED | OUTPATIENT
Start: 2018-01-01 | End: 2018-01-01 | Stop reason: SDUPTHER

## 2018-01-01 RX ORDER — NITROGLYCERIN 0.4 MG/1
0.4 TABLET SUBLINGUAL EVERY 5 MIN PRN
Status: DISCONTINUED | OUTPATIENT
Start: 2018-01-01 | End: 2018-01-01

## 2018-01-01 RX ORDER — ATORVASTATIN CALCIUM 40 MG/1
40 TABLET, FILM COATED ORAL NIGHTLY
Qty: 30 TABLET | Refills: 3 | Status: SHIPPED | OUTPATIENT
Start: 2018-01-01 | End: 2018-01-01

## 2018-01-01 RX ORDER — METOPROLOL TARTRATE 5 MG/5ML
2.5 INJECTION INTRAVENOUS EVERY 10 MIN PRN
Status: DISCONTINUED | OUTPATIENT
Start: 2018-01-01 | End: 2018-01-01

## 2018-01-01 RX ORDER — CHLORHEXIDINE GLUCONATE 4 G/100ML
SOLUTION TOPICAL ONCE
Status: CANCELLED | OUTPATIENT
Start: 2018-01-01 | End: 2018-01-01

## 2018-01-01 RX ORDER — ROCURONIUM BROMIDE 10 MG/ML
INJECTION, SOLUTION INTRAVENOUS PRN
Status: DISCONTINUED | OUTPATIENT
Start: 2018-01-01 | End: 2018-01-01 | Stop reason: SDUPTHER

## 2018-01-01 RX ORDER — CARVEDILOL 6.25 MG/1
6.25 TABLET ORAL 2 TIMES DAILY WITH MEALS
Status: DISCONTINUED | OUTPATIENT
Start: 2018-01-01 | End: 2018-01-01 | Stop reason: HOSPADM

## 2018-01-01 RX ORDER — METRONIDAZOLE 500 MG/1
500 TABLET ORAL EVERY 8 HOURS
Status: DISCONTINUED | OUTPATIENT
Start: 2018-01-01 | End: 2018-01-01 | Stop reason: HOSPADM

## 2018-01-01 RX ORDER — MORPHINE SULFATE 2 MG/ML
2 INJECTION, SOLUTION INTRAMUSCULAR; INTRAVENOUS EVERY 4 HOURS PRN
Status: COMPLETED | OUTPATIENT
Start: 2018-01-01 | End: 2018-01-01

## 2018-01-01 RX ORDER — M-VIT,TX,IRON,MINS/CALC/FOLIC 27MG-0.4MG
1 TABLET ORAL
Status: DISCONTINUED | OUTPATIENT
Start: 2018-01-01 | End: 2018-01-01 | Stop reason: HOSPADM

## 2018-01-01 RX ORDER — ALBUMIN, HUMAN INJ 5% 5 %
SOLUTION INTRAVENOUS PRN
Status: DISCONTINUED | OUTPATIENT
Start: 2018-01-01 | End: 2018-01-01 | Stop reason: SDUPTHER

## 2018-01-01 RX ORDER — CLOPIDOGREL BISULFATE 75 MG/1
300 TABLET ORAL ONCE
Status: COMPLETED | OUTPATIENT
Start: 2018-01-01 | End: 2018-01-01

## 2018-01-01 RX ORDER — CHLORHEXIDINE GLUCONATE 0.12 MG/ML
15 RINSE ORAL ONCE
Status: CANCELLED | OUTPATIENT
Start: 2018-01-01 | End: 2018-01-01

## 2018-01-01 RX ORDER — ACETAMINOPHEN 325 MG/1
325 TABLET ORAL EVERY 6 HOURS PRN
COMMUNITY
Start: 2018-01-01 | End: 2018-01-01 | Stop reason: ALTCHOICE

## 2018-01-01 RX ORDER — FUROSEMIDE 10 MG/ML
40 INJECTION INTRAMUSCULAR; INTRAVENOUS 2 TIMES DAILY
Status: DISCONTINUED | OUTPATIENT
Start: 2018-01-01 | End: 2018-01-01

## 2018-01-01 RX ORDER — POTASSIUM CHLORIDE 7.45 MG/ML
10 INJECTION INTRAVENOUS
Status: COMPLETED | OUTPATIENT
Start: 2018-01-01 | End: 2018-01-01

## 2018-01-01 RX ORDER — SODIUM CHLORIDE 9 MG/ML
INJECTION, SOLUTION INTRAVENOUS CONTINUOUS
Status: DISCONTINUED | OUTPATIENT
Start: 2018-01-01 | End: 2018-01-01 | Stop reason: SDUPTHER

## 2018-01-01 RX ORDER — DOCUSATE SODIUM 100 MG/1
100 CAPSULE, LIQUID FILLED ORAL 2 TIMES DAILY
Status: DISCONTINUED | OUTPATIENT
Start: 2018-01-01 | End: 2018-01-01 | Stop reason: HOSPADM

## 2018-01-01 RX ORDER — DEXTROSE MONOHYDRATE 50 MG/ML
100 INJECTION, SOLUTION INTRAVENOUS PRN
Status: DISCONTINUED | OUTPATIENT
Start: 2018-01-01 | End: 2018-01-01 | Stop reason: RX

## 2018-01-01 RX ORDER — CHLORHEXIDINE GLUCONATE 0.12 MG/ML
15 RINSE ORAL ONCE
Status: COMPLETED | OUTPATIENT
Start: 2018-01-01 | End: 2018-01-01

## 2018-01-01 RX ORDER — ACETAMINOPHEN 650 MG/1
650 SUPPOSITORY RECTAL EVERY 4 HOURS PRN
Status: DISCONTINUED | OUTPATIENT
Start: 2018-01-01 | End: 2018-01-01

## 2018-01-01 RX ORDER — ONDANSETRON 4 MG/1
4 TABLET, FILM COATED ORAL EVERY 4 HOURS PRN
COMMUNITY
End: 2018-01-01 | Stop reason: ALTCHOICE

## 2018-01-01 RX ORDER — SODIUM CHLORIDE 450 MG/100ML
INJECTION, SOLUTION INTRAVENOUS CONTINUOUS
Status: CANCELLED | OUTPATIENT
Start: 2018-01-01

## 2018-01-01 RX ORDER — ENALAPRILAT 2.5 MG/2ML
0.62 INJECTION INTRAVENOUS
Status: DISCONTINUED | OUTPATIENT
Start: 2018-01-01 | End: 2018-01-01

## 2018-01-01 RX ORDER — CHLORHEXIDINE GLUCONATE 0.12 MG/ML
15 RINSE ORAL ONCE
Status: DISCONTINUED | OUTPATIENT
Start: 2018-01-01 | End: 2018-01-01 | Stop reason: HOSPADM

## 2018-01-01 RX ORDER — POTASSIUM CHLORIDE 20MEQ/15ML
40 LIQUID (ML) ORAL PRN
Status: DISCONTINUED | OUTPATIENT
Start: 2018-01-01 | End: 2018-01-01 | Stop reason: HOSPADM

## 2018-01-01 RX ORDER — DRONABINOL 2.5 MG/1
2.5 CAPSULE ORAL 2 TIMES DAILY
Status: DISCONTINUED | OUTPATIENT
Start: 2018-01-01 | End: 2018-01-01 | Stop reason: HOSPADM

## 2018-01-01 RX ORDER — OXYCODONE HYDROCHLORIDE 5 MG/1
5 TABLET ORAL EVERY 4 HOURS PRN
Status: DISCONTINUED | OUTPATIENT
Start: 2018-01-01 | End: 2018-01-01 | Stop reason: HOSPADM

## 2018-01-01 RX ORDER — ATORVASTATIN CALCIUM 20 MG/1
40 TABLET, FILM COATED ORAL NIGHTLY
Status: DISCONTINUED | OUTPATIENT
Start: 2018-01-01 | End: 2018-01-01 | Stop reason: HOSPADM

## 2018-01-01 RX ORDER — FERROUS SULFATE 325(65) MG
325 TABLET ORAL
COMMUNITY

## 2018-01-01 RX ORDER — AMIODARONE HYDROCHLORIDE 200 MG/1
200 TABLET ORAL DAILY
Qty: 30 TABLET | Refills: 0
Start: 2018-01-01 | End: 2018-01-01 | Stop reason: ALTCHOICE

## 2018-01-01 RX ORDER — AMIODARONE HYDROCHLORIDE 200 MG/1
200 TABLET ORAL ONCE
Status: DISCONTINUED | OUTPATIENT
Start: 2018-01-01 | End: 2018-01-01 | Stop reason: HOSPADM

## 2018-01-01 RX ORDER — POTASSIUM CHLORIDE 20MEQ/15ML
20 LIQUID (ML) ORAL 2 TIMES DAILY
Status: DISCONTINUED | OUTPATIENT
Start: 2018-01-01 | End: 2018-01-01

## 2018-01-01 RX ORDER — SODIUM CHLORIDE 0.9 % (FLUSH) 0.9 %
10 SYRINGE (ML) INJECTION PRN
Status: DISCONTINUED | OUTPATIENT
Start: 2018-01-01 | End: 2018-01-01 | Stop reason: SDUPTHER

## 2018-01-01 RX ORDER — OXYCODONE HYDROCHLORIDE 5 MG/1
10 TABLET ORAL EVERY 4 HOURS PRN
Status: DISCONTINUED | OUTPATIENT
Start: 2018-01-01 | End: 2018-01-01 | Stop reason: HOSPADM

## 2018-01-01 RX ORDER — HEPARIN SODIUM 1000 [USP'U]/ML
4000 INJECTION, SOLUTION INTRAVENOUS; SUBCUTANEOUS PRN
Status: DISCONTINUED | OUTPATIENT
Start: 2018-01-01 | End: 2018-01-01 | Stop reason: HOSPADM

## 2018-01-01 RX ORDER — NICOTINE POLACRILEX 4 MG
15 LOZENGE BUCCAL PRN
Status: DISCONTINUED | OUTPATIENT
Start: 2018-01-01 | End: 2018-01-01

## 2018-01-01 RX ORDER — POTASSIUM CHLORIDE 20 MEQ/1
20 TABLET, EXTENDED RELEASE ORAL ONCE
Status: COMPLETED | OUTPATIENT
Start: 2018-01-01 | End: 2018-01-01

## 2018-01-01 RX ORDER — POTASSIUM CHLORIDE 750 MG/1
20 TABLET, FILM COATED, EXTENDED RELEASE ORAL ONCE
Status: COMPLETED | OUTPATIENT
Start: 2018-01-01 | End: 2018-01-01

## 2018-01-01 RX ORDER — DEXTROSE AND SODIUM CHLORIDE 5; .9 G/100ML; G/100ML
100 INJECTION, SOLUTION INTRAVENOUS PRN
Status: DISCONTINUED | OUTPATIENT
Start: 2018-01-01 | End: 2018-01-01

## 2018-01-01 RX ORDER — MAGNESIUM SULFATE IN WATER 40 MG/ML
2 INJECTION, SOLUTION INTRAVENOUS ONCE
Status: COMPLETED | OUTPATIENT
Start: 2018-01-01 | End: 2018-01-01

## 2018-01-01 RX ORDER — FAMOTIDINE 20 MG/1
20 TABLET, FILM COATED ORAL 2 TIMES DAILY
Status: DISCONTINUED | OUTPATIENT
Start: 2018-01-01 | End: 2018-01-01 | Stop reason: DRUGHIGH

## 2018-01-01 RX ORDER — ENALAPRIL MALEATE 5 MG/1
5 TABLET ORAL 2 TIMES DAILY
COMMUNITY

## 2018-01-01 RX ORDER — HEPARIN SODIUM 10000 [USP'U]/100ML
11.5 INJECTION, SOLUTION INTRAVENOUS CONTINUOUS
Status: DISCONTINUED | OUTPATIENT
Start: 2018-01-01 | End: 2018-01-01 | Stop reason: HOSPADM

## 2018-01-01 RX ORDER — SENNOSIDES 8.8 MG/5ML
10 LIQUID ORAL DAILY PRN
Status: DISCONTINUED | OUTPATIENT
Start: 2018-01-01 | End: 2018-01-01 | Stop reason: HOSPADM

## 2018-01-01 RX ORDER — HEPARIN SODIUM 1000 [USP'U]/ML
2000 INJECTION, SOLUTION INTRAVENOUS; SUBCUTANEOUS PRN
Status: DISCONTINUED | OUTPATIENT
Start: 2018-01-01 | End: 2018-01-01 | Stop reason: HOSPADM

## 2018-01-01 RX ORDER — PROTAMINE SULFATE 10 MG/ML
INJECTION, SOLUTION INTRAVENOUS PRN
Status: DISCONTINUED | OUTPATIENT
Start: 2018-01-01 | End: 2018-01-01 | Stop reason: SDUPTHER

## 2018-01-01 RX ORDER — CLOPIDOGREL BISULFATE 75 MG/1
75 TABLET ORAL DAILY
Qty: 30 TABLET | Refills: 3 | Status: SHIPPED | OUTPATIENT
Start: 2018-01-01 | End: 2018-01-01 | Stop reason: ALTCHOICE

## 2018-01-01 RX ORDER — FAMOTIDINE 20 MG/1
20 TABLET, FILM COATED ORAL DAILY
Status: DISCONTINUED | OUTPATIENT
Start: 2018-01-01 | End: 2018-01-01 | Stop reason: HOSPADM

## 2018-01-01 RX ORDER — FENTANYL CITRATE 50 UG/ML
INJECTION, SOLUTION INTRAMUSCULAR; INTRAVENOUS PRN
Status: DISCONTINUED | OUTPATIENT
Start: 2018-01-01 | End: 2018-01-01 | Stop reason: SDUPTHER

## 2018-01-01 RX ORDER — CARVEDILOL 6.25 MG/1
6.25 TABLET ORAL 2 TIMES DAILY WITH MEALS
Qty: 60 TABLET | Refills: 3 | Status: SHIPPED | OUTPATIENT
Start: 2018-01-01 | End: 2018-01-01 | Stop reason: ALTCHOICE

## 2018-01-01 RX ORDER — PROPOFOL 10 MG/ML
INJECTION, EMULSION INTRAVENOUS PRN
Status: DISCONTINUED | OUTPATIENT
Start: 2018-01-01 | End: 2018-01-01 | Stop reason: SDUPTHER

## 2018-01-01 RX ORDER — HEPARIN SODIUM 1000 [USP'U]/ML
4000 INJECTION, SOLUTION INTRAVENOUS; SUBCUTANEOUS ONCE
Status: COMPLETED | OUTPATIENT
Start: 2018-01-01 | End: 2018-01-01

## 2018-01-01 RX ORDER — POTASSIUM CHLORIDE 20MEQ/15ML
20 LIQUID (ML) ORAL DAILY
Status: DISCONTINUED | OUTPATIENT
Start: 2018-01-01 | End: 2018-01-01 | Stop reason: HOSPADM

## 2018-01-01 RX ORDER — SODIUM CHLORIDE 9 MG/ML
INJECTION, SOLUTION INTRAVENOUS CONTINUOUS
Status: DISCONTINUED | OUTPATIENT
Start: 2018-01-01 | End: 2018-01-01

## 2018-01-01 RX ORDER — NITROGLYCERIN 20 MG/100ML
5 INJECTION INTRAVENOUS CONTINUOUS
Status: DISCONTINUED | OUTPATIENT
Start: 2018-01-01 | End: 2018-01-01 | Stop reason: HOSPADM

## 2018-01-01 RX ORDER — AMLODIPINE BESYLATE 5 MG/1
5 TABLET ORAL DAILY
COMMUNITY

## 2018-01-01 RX ORDER — LORAZEPAM 0.5 MG/1
0.5 TABLET ORAL EVERY 8 HOURS PRN
COMMUNITY
End: 2018-01-01 | Stop reason: ALTCHOICE

## 2018-01-01 RX ORDER — HEPARIN SODIUM 10000 [USP'U]/100ML
12 INJECTION, SOLUTION INTRAVENOUS CONTINUOUS
Status: DISCONTINUED | OUTPATIENT
Start: 2018-01-01 | End: 2018-01-01 | Stop reason: ALTCHOICE

## 2018-01-01 RX ADMIN — PREDNISONE 50 MG: 10 TABLET ORAL at 09:56

## 2018-01-01 RX ADMIN — POTASSIUM CHLORIDE 20 MEQ: 40 SOLUTION ORAL at 09:34

## 2018-01-01 RX ADMIN — ALLOPURINOL 100 MG: 100 TABLET ORAL at 10:18

## 2018-01-01 RX ADMIN — DOCUSATE SODIUM 100 MG: 100 CAPSULE ORAL at 20:17

## 2018-01-01 RX ADMIN — ASPIRIN 325 MG: 325 TABLET, DELAYED RELEASE ORAL at 09:27

## 2018-01-01 RX ADMIN — FAMOTIDINE 20 MG: 10 INJECTION, SOLUTION INTRAVENOUS at 09:34

## 2018-01-01 RX ADMIN — ALBUMIN (HUMAN) 12.5 G: 12.5 INJECTION, SOLUTION INTRAVENOUS at 20:43

## 2018-01-01 RX ADMIN — AMIODARONE HYDROCHLORIDE 200 MG: 200 TABLET ORAL at 08:42

## 2018-01-01 RX ADMIN — POTASSIUM CHLORIDE 20 MEQ: 1500 TABLET, EXTENDED RELEASE ORAL at 08:41

## 2018-01-01 RX ADMIN — ALBUMIN (HUMAN) 12.5 G: 12.5 INJECTION, SOLUTION INTRAVENOUS at 05:29

## 2018-01-01 RX ADMIN — POTASSIUM CHLORIDE 20 MEQ: 40 SOLUTION ORAL at 08:42

## 2018-01-01 RX ADMIN — HEPARIN SODIUM 11.5 UNITS/KG/HR: 10000 INJECTION, SOLUTION INTRAVENOUS at 23:42

## 2018-01-01 RX ADMIN — DOCUSATE SODIUM 100 MG: 100 CAPSULE ORAL at 09:27

## 2018-01-01 RX ADMIN — METRONIDAZOLE 500 MG: 500 TABLET, FILM COATED ORAL at 00:30

## 2018-01-01 RX ADMIN — Medication 10 ML: at 10:19

## 2018-01-01 RX ADMIN — METRONIDAZOLE 500 MG: 500 TABLET, FILM COATED ORAL at 08:42

## 2018-01-01 RX ADMIN — Medication 125 MG: at 12:46

## 2018-01-01 RX ADMIN — MICONAZOLE NITRATE: 2 POWDER TOPICAL at 08:29

## 2018-01-01 RX ADMIN — CEFEPIME HYDROCHLORIDE 1 G: 1 INJECTION, POWDER, FOR SOLUTION INTRAMUSCULAR; INTRAVENOUS at 16:05

## 2018-01-01 RX ADMIN — NITROGLYCERIN 35 MCG/MIN: 20 INJECTION INTRAVENOUS at 01:00

## 2018-01-01 RX ADMIN — Medication 10 ML: at 20:02

## 2018-01-01 RX ADMIN — Medication 125 MG: at 05:40

## 2018-01-01 RX ADMIN — AMIODARONE HYDROCHLORIDE 200 MG: 200 TABLET ORAL at 11:04

## 2018-01-01 RX ADMIN — CEFEPIME HYDROCHLORIDE 1 G: 1 INJECTION, POWDER, FOR SOLUTION INTRAMUSCULAR; INTRAVENOUS at 03:21

## 2018-01-01 RX ADMIN — FENTANYL CITRATE 250 MCG: 50 INJECTION INTRAMUSCULAR; INTRAVENOUS at 08:30

## 2018-01-01 RX ADMIN — FUROSEMIDE 40 MG: 40 TABLET ORAL at 08:28

## 2018-01-01 RX ADMIN — Medication 10 ML: at 09:27

## 2018-01-01 RX ADMIN — MORPHINE SULFATE 2 MG: 2 INJECTION, SOLUTION INTRAMUSCULAR; INTRAVENOUS at 19:25

## 2018-01-01 RX ADMIN — FUROSEMIDE 40 MG: 40 TABLET ORAL at 08:42

## 2018-01-01 RX ADMIN — MICONAZOLE NITRATE: 2 POWDER TOPICAL at 08:30

## 2018-01-01 RX ADMIN — METRONIDAZOLE 500 MG: 500 TABLET, FILM COATED ORAL at 15:50

## 2018-01-01 RX ADMIN — MULTIPLE VITAMINS W/ MINERALS TAB 1 TABLET: TAB at 08:19

## 2018-01-01 RX ADMIN — Medication 125 MG: at 18:06

## 2018-01-01 RX ADMIN — Medication 125 MG: at 14:44

## 2018-01-01 RX ADMIN — DOCUSATE SODIUM 100 MG: 100 CAPSULE ORAL at 08:31

## 2018-01-01 RX ADMIN — FAMOTIDINE 20 MG: 20 TABLET, FILM COATED ORAL at 08:29

## 2018-01-01 RX ADMIN — AMIODARONE HYDROCHLORIDE 150 MG: 1.5 INJECTION, SOLUTION INTRAVENOUS at 12:22

## 2018-01-01 RX ADMIN — ACETAMINOPHEN 650 MG: 325 TABLET ORAL at 04:19

## 2018-01-01 RX ADMIN — PROTAMINE SULFATE 180 MG: 10 INJECTION, SOLUTION INTRAVENOUS at 12:22

## 2018-01-01 RX ADMIN — METRONIDAZOLE 500 MG: 500 TABLET, FILM COATED ORAL at 16:09

## 2018-01-01 RX ADMIN — FAMOTIDINE 20 MG: 20 TABLET, FILM COATED ORAL at 08:50

## 2018-01-01 RX ADMIN — MULTIPLE VITAMINS W/ MINERALS TAB 1 TABLET: TAB at 09:27

## 2018-01-01 RX ADMIN — POTASSIUM CHLORIDE 10 MEQ: 10 INJECTION, SOLUTION INTRAVENOUS at 09:47

## 2018-01-01 RX ADMIN — ASPIRIN 81 MG 81 MG: 81 TABLET ORAL at 09:12

## 2018-01-01 RX ADMIN — DRONABINOL 2.5 MG: 2.5 CAPSULE ORAL at 21:33

## 2018-01-01 RX ADMIN — Medication 125 MG: at 06:13

## 2018-01-01 RX ADMIN — Medication 10 ML: at 21:39

## 2018-01-01 RX ADMIN — Medication 125 MG: at 13:09

## 2018-01-01 RX ADMIN — ALLOPURINOL 100 MG: 100 TABLET ORAL at 09:54

## 2018-01-01 RX ADMIN — Medication 125 MG: at 09:38

## 2018-01-01 RX ADMIN — AMIODARONE HYDROCHLORIDE 0.5 MG/MIN: 1.8 INJECTION, SOLUTION INTRAVENOUS at 06:33

## 2018-01-01 RX ADMIN — FENTANYL CITRATE 250 MCG: 50 INJECTION INTRAMUSCULAR; INTRAVENOUS at 08:45

## 2018-01-01 RX ADMIN — METRONIDAZOLE 500 MG: 500 TABLET, FILM COATED ORAL at 09:54

## 2018-01-01 RX ADMIN — FAMOTIDINE 20 MG: 20 TABLET, FILM COATED ORAL at 08:31

## 2018-01-01 RX ADMIN — ENOXAPARIN SODIUM 30 MG: 30 INJECTION SUBCUTANEOUS at 21:39

## 2018-01-01 RX ADMIN — ALLOPURINOL 100 MG: 100 TABLET ORAL at 08:36

## 2018-01-01 RX ADMIN — CEFUROXIME AXETIL 500 MG: 250 TABLET ORAL at 14:44

## 2018-01-01 RX ADMIN — METRONIDAZOLE 500 MG: 500 TABLET, FILM COATED ORAL at 15:22

## 2018-01-01 RX ADMIN — ACETAMINOPHEN 650 MG: 325 TABLET ORAL at 08:50

## 2018-01-01 RX ADMIN — METOPROLOL TARTRATE 50 MG: 50 TABLET, FILM COATED ORAL at 08:43

## 2018-01-01 RX ADMIN — FAMOTIDINE 20 MG: 20 TABLET, FILM COATED ORAL at 08:28

## 2018-01-01 RX ADMIN — OXYCODONE HYDROCHLORIDE 10 MG: 5 TABLET ORAL at 11:43

## 2018-01-01 RX ADMIN — CARVEDILOL 6.25 MG: 6.25 TABLET, FILM COATED ORAL at 08:36

## 2018-01-01 RX ADMIN — Medication 10 ML: at 08:27

## 2018-01-01 RX ADMIN — ENOXAPARIN SODIUM 40 MG: 40 INJECTION SUBCUTANEOUS at 08:35

## 2018-01-01 RX ADMIN — METRONIDAZOLE 500 MG: 500 TABLET, FILM COATED ORAL at 08:36

## 2018-01-01 RX ADMIN — ASPIRIN 325 MG: 325 TABLET, DELAYED RELEASE ORAL at 08:42

## 2018-01-01 RX ADMIN — Medication 2 UNITS/HR: at 16:35

## 2018-01-01 RX ADMIN — POTASSIUM CHLORIDE 20 MEQ: 40 SOLUTION ORAL at 20:17

## 2018-01-01 RX ADMIN — FUROSEMIDE 40 MG: 10 INJECTION, SOLUTION INTRAMUSCULAR; INTRAVENOUS at 11:26

## 2018-01-01 RX ADMIN — DOCUSATE SODIUM 100 MG: 100 CAPSULE ORAL at 08:50

## 2018-01-01 RX ADMIN — FUROSEMIDE 40 MG: 10 INJECTION, SOLUTION INTRAMUSCULAR; INTRAVENOUS at 19:03

## 2018-01-01 RX ADMIN — ONDANSETRON 4 MG: 2 INJECTION INTRAMUSCULAR; INTRAVENOUS at 01:14

## 2018-01-01 RX ADMIN — AMIODARONE HYDROCHLORIDE 200 MG: 200 TABLET ORAL at 08:31

## 2018-01-01 RX ADMIN — METOPROLOL TARTRATE 25 MG: 25 TABLET ORAL at 08:31

## 2018-01-01 RX ADMIN — DOCUSATE SODIUM 100 MG: 100 CAPSULE ORAL at 08:36

## 2018-01-01 RX ADMIN — ACETAMINOPHEN 650 MG: 325 TABLET ORAL at 06:04

## 2018-01-01 RX ADMIN — Medication 10 ML: at 08:37

## 2018-01-01 RX ADMIN — Medication 3 UNITS: at 12:46

## 2018-01-01 RX ADMIN — METRONIDAZOLE 500 MG: 500 TABLET, FILM COATED ORAL at 16:52

## 2018-01-01 RX ADMIN — CARVEDILOL 6.25 MG: 6.25 TABLET, FILM COATED ORAL at 16:16

## 2018-01-01 RX ADMIN — METRONIDAZOLE 500 MG: 500 TABLET, FILM COATED ORAL at 00:00

## 2018-01-01 RX ADMIN — ALLOPURINOL 100 MG: 100 TABLET ORAL at 08:28

## 2018-01-01 RX ADMIN — Medication 125 MG: at 06:04

## 2018-01-01 RX ADMIN — AMIODARONE HYDROCHLORIDE 200 MG: 200 TABLET ORAL at 15:37

## 2018-01-01 RX ADMIN — Medication 125 MG: at 00:01

## 2018-01-01 RX ADMIN — HEPARIN SODIUM 25000 UNITS: 1000 INJECTION, SOLUTION INTRAVENOUS; SUBCUTANEOUS at 09:45

## 2018-01-01 RX ADMIN — Medication 125 MG: at 21:32

## 2018-01-01 RX ADMIN — METOPROLOL TARTRATE 2.5 MG: 5 INJECTION INTRAVENOUS at 10:47

## 2018-01-01 RX ADMIN — FAMOTIDINE 20 MG: 20 TABLET, FILM COATED ORAL at 08:42

## 2018-01-01 RX ADMIN — ASPIRIN 325 MG: 325 TABLET, DELAYED RELEASE ORAL at 08:28

## 2018-01-01 RX ADMIN — AMIODARONE HYDROCHLORIDE 200 MG: 200 TABLET ORAL at 13:14

## 2018-01-01 RX ADMIN — Medication 125 MG: at 13:21

## 2018-01-01 RX ADMIN — AMIODARONE HYDROCHLORIDE 200 MG: 200 TABLET ORAL at 21:29

## 2018-01-01 RX ADMIN — DESMOPRESSIN ACETATE 24 MCG: 4 INJECTION, SOLUTION INTRAVENOUS; SUBCUTANEOUS at 12:30

## 2018-01-01 RX ADMIN — MICONAZOLE NITRATE: 2 POWDER TOPICAL at 21:29

## 2018-01-01 RX ADMIN — MULTIPLE VITAMINS W/ MINERALS TAB 1 TABLET: TAB at 08:27

## 2018-01-01 RX ADMIN — POTASSIUM CHLORIDE 20 MEQ: 40 SOLUTION ORAL at 09:54

## 2018-01-01 RX ADMIN — SODIUM CHLORIDE: 900 INJECTION INTRAVENOUS at 06:32

## 2018-01-01 RX ADMIN — OXYCODONE HYDROCHLORIDE 5 MG: 5 TABLET ORAL at 04:21

## 2018-01-01 RX ADMIN — DRONABINOL 2.5 MG: 2.5 CAPSULE ORAL at 08:28

## 2018-01-01 RX ADMIN — ATORVASTATIN CALCIUM 40 MG: 20 TABLET, FILM COATED ORAL at 21:29

## 2018-01-01 RX ADMIN — ATORVASTATIN CALCIUM 40 MG: 20 TABLET, FILM COATED ORAL at 20:38

## 2018-01-01 RX ADMIN — FAMOTIDINE 20 MG: 10 INJECTION, SOLUTION INTRAVENOUS at 15:45

## 2018-01-01 RX ADMIN — AMIODARONE HYDROCHLORIDE 200 MG: 200 TABLET ORAL at 10:13

## 2018-01-01 RX ADMIN — MICONAZOLE NITRATE: 2 POWDER TOPICAL at 20:39

## 2018-01-01 RX ADMIN — FAMOTIDINE 20 MG: 20 TABLET, FILM COATED ORAL at 10:17

## 2018-01-01 RX ADMIN — ACETAMINOPHEN 650 MG: 325 TABLET ORAL at 08:49

## 2018-01-01 RX ADMIN — AMIODARONE HYDROCHLORIDE 150 MG: 1.5 INJECTION, SOLUTION INTRAVENOUS at 10:51

## 2018-01-01 RX ADMIN — CEFAZOLIN SODIUM 1 G: 2 SOLUTION INTRAVENOUS at 12:15

## 2018-01-01 RX ADMIN — SODIUM CHLORIDE 250 ML: 9 INJECTION, SOLUTION INTRAVENOUS at 09:23

## 2018-01-01 RX ADMIN — METRONIDAZOLE 500 MG: 500 TABLET, FILM COATED ORAL at 08:28

## 2018-01-01 RX ADMIN — ASPIRIN 325 MG: 325 TABLET, DELAYED RELEASE ORAL at 08:36

## 2018-01-01 RX ADMIN — MICONAZOLE NITRATE: 2 POWDER TOPICAL at 12:47

## 2018-01-01 RX ADMIN — FUROSEMIDE 40 MG: 40 TABLET ORAL at 08:43

## 2018-01-01 RX ADMIN — AMIODARONE HYDROCHLORIDE 200 MG: 200 TABLET ORAL at 21:39

## 2018-01-01 RX ADMIN — ACETAMINOPHEN 650 MG: 325 TABLET ORAL at 21:18

## 2018-01-01 RX ADMIN — CEFAZOLIN SODIUM 2 G: 2 SOLUTION INTRAVENOUS at 08:33

## 2018-01-01 RX ADMIN — Medication 125 MG: at 18:35

## 2018-01-01 RX ADMIN — ALLOPURINOL 100 MG: 100 TABLET ORAL at 08:43

## 2018-01-01 RX ADMIN — SODIUM CHLORIDE: 900 INJECTION INTRAVENOUS at 14:30

## 2018-01-01 RX ADMIN — ENOXAPARIN SODIUM 30 MG: 30 INJECTION SUBCUTANEOUS at 08:59

## 2018-01-01 RX ADMIN — METRONIDAZOLE 500 MG: 500 TABLET, FILM COATED ORAL at 10:18

## 2018-01-01 RX ADMIN — Medication 125 MG: at 13:04

## 2018-01-01 RX ADMIN — ACETAMINOPHEN 650 MG: 325 TABLET ORAL at 16:15

## 2018-01-01 RX ADMIN — METOPROLOL TARTRATE 50 MG: 50 TABLET, FILM COATED ORAL at 11:07

## 2018-01-01 RX ADMIN — AMIODARONE HYDROCHLORIDE 1 MG/MIN: 1.8 INJECTION, SOLUTION INTRAVENOUS at 10:52

## 2018-01-01 RX ADMIN — Medication 125 MG: at 19:03

## 2018-01-01 RX ADMIN — METRONIDAZOLE 500 MG: 500 TABLET, FILM COATED ORAL at 08:43

## 2018-01-01 RX ADMIN — Medication 10 ML: at 08:46

## 2018-01-01 RX ADMIN — FUROSEMIDE 40 MG: 40 TABLET ORAL at 09:33

## 2018-01-01 RX ADMIN — POTASSIUM CHLORIDE 20 MEQ: 40 SOLUTION ORAL at 21:18

## 2018-01-01 RX ADMIN — SILVER SULFADIAZINE: 10 CREAM TOPICAL at 15:54

## 2018-01-01 RX ADMIN — MIDAZOLAM HYDROCHLORIDE 2 MG: 1 INJECTION, SOLUTION INTRAMUSCULAR; INTRAVENOUS at 07:31

## 2018-01-01 RX ADMIN — DOCUSATE SODIUM 100 MG: 100 CAPSULE ORAL at 21:29

## 2018-01-01 RX ADMIN — AMIODARONE HYDROCHLORIDE 200 MG: 200 TABLET ORAL at 09:27

## 2018-01-01 RX ADMIN — ACETAMINOPHEN 650 MG: 325 TABLET ORAL at 13:00

## 2018-01-01 RX ADMIN — METOPROLOL TARTRATE 50 MG: 50 TABLET, FILM COATED ORAL at 09:33

## 2018-01-01 RX ADMIN — HEPARIN SODIUM 4000 UNITS: 1000 INJECTION, SOLUTION INTRAVENOUS; SUBCUTANEOUS at 23:42

## 2018-01-01 RX ADMIN — CEFEPIME HYDROCHLORIDE 1 G: 1 INJECTION, POWDER, FOR SOLUTION INTRAMUSCULAR; INTRAVENOUS at 03:35

## 2018-01-01 RX ADMIN — PANTOPRAZOLE SODIUM 40 MG: 40 TABLET, DELAYED RELEASE ORAL at 06:30

## 2018-01-01 RX ADMIN — Medication 125 MG: at 06:26

## 2018-01-01 RX ADMIN — METOPROLOL TARTRATE 50 MG: 50 TABLET, FILM COATED ORAL at 20:39

## 2018-01-01 RX ADMIN — Medication 125 MG: at 23:22

## 2018-01-01 RX ADMIN — POTASSIUM CHLORIDE 20 MEQ: 40 SOLUTION ORAL at 08:26

## 2018-01-01 RX ADMIN — HYDROCORTISONE SODIUM SUCCINATE 200 MG: 100 INJECTION, POWDER, FOR SOLUTION INTRAMUSCULAR; INTRAVENOUS at 09:56

## 2018-01-01 RX ADMIN — Medication 125 MG: at 21:20

## 2018-01-01 RX ADMIN — HEPARIN SODIUM 5000 UNITS: 1000 INJECTION, SOLUTION INTRAVENOUS; SUBCUTANEOUS at 08:35

## 2018-01-01 RX ADMIN — Medication 15 ML: at 06:38

## 2018-01-01 RX ADMIN — MULTIPLE VITAMINS W/ MINERALS TAB 1 TABLET: TAB at 08:42

## 2018-01-01 RX ADMIN — CLOPIDOGREL 75 MG: 75 TABLET, FILM COATED ORAL at 10:13

## 2018-01-01 RX ADMIN — MICONAZOLE NITRATE: 2 POWDER TOPICAL at 09:00

## 2018-01-01 RX ADMIN — METRONIDAZOLE 500 MG: 500 TABLET, FILM COATED ORAL at 01:06

## 2018-01-01 RX ADMIN — Medication 125 MG: at 21:39

## 2018-01-01 RX ADMIN — Medication 10 ML: at 16:06

## 2018-01-01 RX ADMIN — AMIODARONE HYDROCHLORIDE 200 MG: 200 TABLET ORAL at 08:28

## 2018-01-01 RX ADMIN — METOPROLOL TARTRATE 50 MG: 50 TABLET, FILM COATED ORAL at 08:27

## 2018-01-01 RX ADMIN — ATORVASTATIN CALCIUM 40 MG: 20 TABLET, FILM COATED ORAL at 20:39

## 2018-01-01 RX ADMIN — METRONIDAZOLE 500 MG: 500 TABLET, FILM COATED ORAL at 16:57

## 2018-01-01 RX ADMIN — AMIODARONE HYDROCHLORIDE 200 MG: 200 TABLET ORAL at 20:39

## 2018-01-01 RX ADMIN — METOPROLOL TARTRATE 50 MG: 50 TABLET, FILM COATED ORAL at 23:22

## 2018-01-01 RX ADMIN — SODIUM CHLORIDE, PRESERVATIVE FREE 10 ML: 5 INJECTION INTRAVENOUS at 15:32

## 2018-01-01 RX ADMIN — ACETAMINOPHEN 650 MG: 325 TABLET ORAL at 23:50

## 2018-01-01 RX ADMIN — POTASSIUM CHLORIDE 20 MEQ: 40 SOLUTION ORAL at 08:30

## 2018-01-01 RX ADMIN — Medication 125 MG: at 00:32

## 2018-01-01 RX ADMIN — METOPROLOL TARTRATE 50 MG: 50 TABLET, FILM COATED ORAL at 20:01

## 2018-01-01 RX ADMIN — Medication 125 MG: at 08:33

## 2018-01-01 RX ADMIN — DRONABINOL 2.5 MG: 2.5 CAPSULE ORAL at 19:53

## 2018-01-01 RX ADMIN — METRONIDAZOLE 500 MG: 500 TABLET, FILM COATED ORAL at 23:37

## 2018-01-01 RX ADMIN — ACETAMINOPHEN 650 MG: 325 TABLET ORAL at 06:03

## 2018-01-01 RX ADMIN — METRONIDAZOLE 500 MG: 500 TABLET, FILM COATED ORAL at 15:54

## 2018-01-01 RX ADMIN — FAMOTIDINE 20 MG: 20 TABLET, FILM COATED ORAL at 09:54

## 2018-01-01 RX ADMIN — ATORVASTATIN CALCIUM 40 MG: 20 TABLET, FILM COATED ORAL at 20:22

## 2018-01-01 RX ADMIN — SODIUM CHLORIDE: 9 INJECTION, SOLUTION INTRAVENOUS at 09:41

## 2018-01-01 RX ADMIN — DRONABINOL 2.5 MG: 2.5 CAPSULE ORAL at 21:29

## 2018-01-01 RX ADMIN — ATORVASTATIN CALCIUM 40 MG: 20 TABLET, FILM COATED ORAL at 21:33

## 2018-01-01 RX ADMIN — ASPIRIN 325 MG: 325 TABLET, DELAYED RELEASE ORAL at 10:18

## 2018-01-01 RX ADMIN — Medication 125 MG: at 20:51

## 2018-01-01 RX ADMIN — FUROSEMIDE 40 MG: 40 TABLET ORAL at 09:54

## 2018-01-01 RX ADMIN — ENOXAPARIN SODIUM 30 MG: 30 INJECTION SUBCUTANEOUS at 18:37

## 2018-01-01 RX ADMIN — AMIODARONE HYDROCHLORIDE 200 MG: 200 TABLET ORAL at 10:17

## 2018-01-01 RX ADMIN — MORPHINE SULFATE 2 MG: 2 INJECTION, SOLUTION INTRAMUSCULAR; INTRAVENOUS at 02:35

## 2018-01-01 RX ADMIN — Medication 10 ML: at 15:45

## 2018-01-01 RX ADMIN — ACETAMINOPHEN 650 MG: 325 TABLET ORAL at 12:46

## 2018-01-01 RX ADMIN — HEPARIN SODIUM 12 UNITS/KG/HR: 10000 INJECTION, SOLUTION INTRAVENOUS at 09:40

## 2018-01-01 RX ADMIN — Medication 10 ML: at 21:33

## 2018-01-01 RX ADMIN — MAGNESIUM SULFATE HEPTAHYDRATE 2 G: 40 INJECTION, SOLUTION INTRAVENOUS at 11:36

## 2018-01-01 RX ADMIN — AMIODARONE HYDROCHLORIDE 200 MG: 200 TABLET ORAL at 21:19

## 2018-01-01 RX ADMIN — Medication 10 ML: at 20:22

## 2018-01-01 RX ADMIN — MICONAZOLE NITRATE: 2 POWDER TOPICAL at 08:45

## 2018-01-01 RX ADMIN — FAMOTIDINE 20 MG: 20 TABLET, FILM COATED ORAL at 08:36

## 2018-01-01 RX ADMIN — Medication 10 ML: at 19:54

## 2018-01-01 RX ADMIN — Medication 10 ML: at 20:39

## 2018-01-01 RX ADMIN — AMIODARONE HYDROCHLORIDE 0.5 MG/MIN: 1.8 INJECTION, SOLUTION INTRAVENOUS at 18:01

## 2018-01-01 RX ADMIN — ALBUMIN (HUMAN) 250 ML: 12.5 INJECTION, SOLUTION INTRAVENOUS at 11:51

## 2018-01-01 RX ADMIN — Medication 10 ML: at 20:41

## 2018-01-01 RX ADMIN — OXYCODONE HYDROCHLORIDE 5 MG: 5 TABLET ORAL at 04:54

## 2018-01-01 RX ADMIN — HEPARIN SODIUM 5000 UNITS: 1000 INJECTION, SOLUTION INTRAVENOUS; SUBCUTANEOUS at 09:36

## 2018-01-01 RX ADMIN — DRONABINOL 2.5 MG: 2.5 CAPSULE ORAL at 08:37

## 2018-01-01 RX ADMIN — CEFEPIME HYDROCHLORIDE 1 G: 1 INJECTION, POWDER, FOR SOLUTION INTRAMUSCULAR; INTRAVENOUS at 15:50

## 2018-01-01 RX ADMIN — FAMOTIDINE 20 MG: 20 TABLET, FILM COATED ORAL at 08:43

## 2018-01-01 RX ADMIN — ATORVASTATIN CALCIUM 40 MG: 20 TABLET, FILM COATED ORAL at 20:01

## 2018-01-01 RX ADMIN — AMIODARONE HYDROCHLORIDE 200 MG: 200 TABLET ORAL at 21:15

## 2018-01-01 RX ADMIN — MILRINONE LACTATE 0.12 MCG/KG/MIN: 1 INJECTION, SOLUTION INTRAVENOUS at 18:01

## 2018-01-01 RX ADMIN — ENOXAPARIN SODIUM 40 MG: 40 INJECTION SUBCUTANEOUS at 09:34

## 2018-01-01 RX ADMIN — CEFAZOLIN SODIUM 2 G: 2 SOLUTION INTRAVENOUS at 04:13

## 2018-01-01 RX ADMIN — Medication 125 MG: at 12:52

## 2018-01-01 RX ADMIN — OXYCODONE HYDROCHLORIDE 10 MG: 5 TABLET ORAL at 18:35

## 2018-01-01 RX ADMIN — PROPOFOL 75 MG: 10 INJECTION, EMULSION INTRAVENOUS at 07:51

## 2018-01-01 RX ADMIN — Medication 10 ML: at 21:15

## 2018-01-01 RX ADMIN — DRONABINOL 2.5 MG: 2.5 CAPSULE ORAL at 08:35

## 2018-01-01 RX ADMIN — MULTIPLE VITAMINS W/ MINERALS TAB 1 TABLET: TAB at 08:50

## 2018-01-01 RX ADMIN — MORPHINE SULFATE 2 MG: 2 INJECTION, SOLUTION INTRAMUSCULAR; INTRAVENOUS at 11:10

## 2018-01-01 RX ADMIN — CHLORHEXIDINE GLUCONATE: 4 SOLUTION TOPICAL at 06:38

## 2018-01-01 RX ADMIN — Medication 10 ML: at 09:55

## 2018-01-01 RX ADMIN — CEFUROXIME AXETIL 500 MG: 250 TABLET ORAL at 08:28

## 2018-01-01 RX ADMIN — Medication 125 MG: at 00:58

## 2018-01-01 RX ADMIN — ASPIRIN 325 MG: 325 TABLET, DELAYED RELEASE ORAL at 08:20

## 2018-01-01 RX ADMIN — ASPIRIN 325 MG: 325 TABLET, DELAYED RELEASE ORAL at 08:50

## 2018-01-01 RX ADMIN — Medication 10 ML: at 21:29

## 2018-01-01 RX ADMIN — Medication 10 ML: at 08:20

## 2018-01-01 RX ADMIN — MULTIPLE VITAMINS W/ MINERALS TAB 1 TABLET: TAB at 08:36

## 2018-01-01 RX ADMIN — ALLOPURINOL 100 MG: 100 TABLET ORAL at 16:15

## 2018-01-01 RX ADMIN — DIPHENHYDRAMINE HYDROCHLORIDE 50 MG: 50 INJECTION, SOLUTION INTRAMUSCULAR; INTRAVENOUS at 09:55

## 2018-01-01 RX ADMIN — POTASSIUM CHLORIDE 20 MEQ: 40 SOLUTION ORAL at 10:49

## 2018-01-01 RX ADMIN — ALLOPURINOL 100 MG: 100 TABLET ORAL at 08:42

## 2018-01-01 RX ADMIN — ATORVASTATIN CALCIUM 40 MG: 20 TABLET, FILM COATED ORAL at 20:17

## 2018-01-01 RX ADMIN — AMIODARONE HYDROCHLORIDE 200 MG: 200 TABLET ORAL at 08:43

## 2018-01-01 RX ADMIN — ENOXAPARIN SODIUM 30 MG: 30 INJECTION SUBCUTANEOUS at 18:06

## 2018-01-01 RX ADMIN — POTASSIUM CHLORIDE 20 MEQ: 40 SOLUTION ORAL at 10:18

## 2018-01-01 RX ADMIN — ASPIRIN 325 MG: 325 TABLET, DELAYED RELEASE ORAL at 08:43

## 2018-01-01 RX ADMIN — Medication 10 ML: at 08:44

## 2018-01-01 RX ADMIN — CARVEDILOL 6.25 MG: 6.25 TABLET, FILM COATED ORAL at 16:57

## 2018-01-01 RX ADMIN — MICONAZOLE NITRATE: 2 POWDER TOPICAL at 09:56

## 2018-01-01 RX ADMIN — METOPROLOL TARTRATE 50 MG: 50 TABLET, FILM COATED ORAL at 09:54

## 2018-01-01 RX ADMIN — CEFAZOLIN SODIUM 2 G: 2 SOLUTION INTRAVENOUS at 04:22

## 2018-01-01 RX ADMIN — Medication 10 ML: at 08:32

## 2018-01-01 RX ADMIN — Medication 25 MG: at 12:13

## 2018-01-01 RX ADMIN — PHENYLEPHRINE HYDROCHLORIDE 50 MCG/MIN: 10 INJECTION, SOLUTION INTRAMUSCULAR; INTRAVENOUS; SUBCUTANEOUS at 11:37

## 2018-01-01 RX ADMIN — SODIUM CHLORIDE: 9 INJECTION, SOLUTION INTRAVENOUS at 07:31

## 2018-01-01 RX ADMIN — ALBUMIN (HUMAN) 12.5 G: 12.5 INJECTION, SOLUTION INTRAVENOUS at 15:25

## 2018-01-01 RX ADMIN — MAGNESIUM SULFATE HEPTAHYDRATE 1.5 G: 500 INJECTION, SOLUTION INTRAMUSCULAR; INTRAVENOUS at 23:17

## 2018-01-01 RX ADMIN — ALLOPURINOL 100 MG: 100 TABLET ORAL at 08:20

## 2018-01-01 RX ADMIN — ACETAMINOPHEN 650 MG: 325 TABLET ORAL at 18:10

## 2018-01-01 RX ADMIN — ENOXAPARIN SODIUM 30 MG: 30 INJECTION SUBCUTANEOUS at 20:40

## 2018-01-01 RX ADMIN — AMIODARONE HYDROCHLORIDE 200 MG: 200 TABLET ORAL at 08:20

## 2018-01-01 RX ADMIN — Medication 10 ML: at 20:17

## 2018-01-01 RX ADMIN — ACETAMINOPHEN 650 MG: 325 TABLET ORAL at 00:01

## 2018-01-01 RX ADMIN — METRONIDAZOLE 500 MG: 500 TABLET, FILM COATED ORAL at 17:14

## 2018-01-01 RX ADMIN — ASPIRIN 325 MG: 325 TABLET, DELAYED RELEASE ORAL at 08:30

## 2018-01-01 RX ADMIN — CEFEPIME HYDROCHLORIDE 1 G: 1 INJECTION, POWDER, FOR SOLUTION INTRAMUSCULAR; INTRAVENOUS at 05:21

## 2018-01-01 RX ADMIN — ASPIRIN 325 MG: 325 TABLET, DELAYED RELEASE ORAL at 09:54

## 2018-01-01 RX ADMIN — SODIUM CHLORIDE: 9 INJECTION, SOLUTION INTRAVENOUS at 09:55

## 2018-01-01 RX ADMIN — METRONIDAZOLE 500 MG: 500 TABLET, FILM COATED ORAL at 00:12

## 2018-01-01 RX ADMIN — POTASSIUM CHLORIDE 10 MEQ: 10 INJECTION, SOLUTION INTRAVENOUS at 08:46

## 2018-01-01 RX ADMIN — POTASSIUM CHLORIDE 20 MEQ: 40 SOLUTION ORAL at 08:51

## 2018-01-01 RX ADMIN — Medication 125 MG: at 01:30

## 2018-01-01 RX ADMIN — Medication 125 MG: at 06:03

## 2018-01-01 RX ADMIN — METRONIDAZOLE 500 MG: 500 TABLET, FILM COATED ORAL at 18:28

## 2018-01-01 RX ADMIN — CEFAZOLIN SODIUM 2 G: 2 SOLUTION INTRAVENOUS at 11:34

## 2018-01-01 RX ADMIN — AMIODARONE HYDROCHLORIDE 200 MG: 200 TABLET ORAL at 08:50

## 2018-01-01 RX ADMIN — MICONAZOLE NITRATE: 2 POWDER TOPICAL at 19:54

## 2018-01-01 RX ADMIN — MILRINONE LACTATE 0.12 MCG/KG/MIN: 1 INJECTION, SOLUTION INTRAVENOUS at 01:45

## 2018-01-01 RX ADMIN — ONDANSETRON 4 MG: 2 INJECTION INTRAMUSCULAR; INTRAVENOUS at 02:34

## 2018-01-01 RX ADMIN — FENTANYL CITRATE 250 MCG: 50 INJECTION INTRAMUSCULAR; INTRAVENOUS at 07:51

## 2018-01-01 RX ADMIN — ATORVASTATIN CALCIUM 40 MG: 20 TABLET, FILM COATED ORAL at 21:38

## 2018-01-01 RX ADMIN — METOPROLOL TARTRATE 50 MG: 50 TABLET, FILM COATED ORAL at 21:38

## 2018-01-01 RX ADMIN — OXYCODONE HYDROCHLORIDE 5 MG: 5 TABLET ORAL at 23:43

## 2018-01-01 RX ADMIN — Medication 125 MG: at 13:57

## 2018-01-01 RX ADMIN — ALBUMIN (HUMAN) 25 G: 12.5 INJECTION, SOLUTION INTRAVENOUS at 13:55

## 2018-01-01 RX ADMIN — MULTIPLE VITAMINS W/ MINERALS TAB 1 TABLET: TAB at 08:28

## 2018-01-01 RX ADMIN — MICONAZOLE NITRATE: 2 POWDER TOPICAL at 20:02

## 2018-01-01 RX ADMIN — FAMOTIDINE 20 MG: 20 TABLET, FILM COATED ORAL at 08:20

## 2018-01-01 RX ADMIN — FUROSEMIDE 40 MG: 40 TABLET ORAL at 10:17

## 2018-01-01 RX ADMIN — Medication 10 ML: at 08:42

## 2018-01-01 RX ADMIN — POTASSIUM CHLORIDE 20 MEQ: 40 SOLUTION ORAL at 11:26

## 2018-01-01 RX ADMIN — MULTIPLE VITAMINS W/ MINERALS TAB 1 TABLET: TAB at 09:54

## 2018-01-01 RX ADMIN — DRONABINOL 2.5 MG: 2.5 CAPSULE ORAL at 20:03

## 2018-01-01 RX ADMIN — METOPROLOL SUCCINATE 50 MG: 50 TABLET, FILM COATED, EXTENDED RELEASE ORAL at 09:09

## 2018-01-01 RX ADMIN — ACETAMINOPHEN 650 MG: 325 TABLET ORAL at 18:05

## 2018-01-01 RX ADMIN — METOPROLOL TARTRATE 50 MG: 50 TABLET, FILM COATED ORAL at 10:18

## 2018-01-01 RX ADMIN — ATORVASTATIN CALCIUM 40 MG: 20 TABLET, FILM COATED ORAL at 21:20

## 2018-01-01 RX ADMIN — DOCUSATE SODIUM 100 MG: 100 CAPSULE ORAL at 20:22

## 2018-01-01 RX ADMIN — AMIODARONE HYDROCHLORIDE 200 MG: 200 TABLET ORAL at 09:54

## 2018-01-01 RX ADMIN — AMIODARONE HYDROCHLORIDE 200 MG: 200 TABLET ORAL at 20:38

## 2018-01-01 RX ADMIN — MULTIPLE VITAMINS W/ MINERALS TAB 1 TABLET: TAB at 10:17

## 2018-01-01 RX ADMIN — CLOPIDOGREL BISULFATE 300 MG: 75 TABLET ORAL at 09:09

## 2018-01-01 RX ADMIN — CEFUROXIME AXETIL 500 MG: 250 TABLET ORAL at 23:51

## 2018-01-01 RX ADMIN — METOPROLOL TARTRATE 50 MG: 50 TABLET, FILM COATED ORAL at 00:04

## 2018-01-01 RX ADMIN — METRONIDAZOLE 500 MG: 500 TABLET, FILM COATED ORAL at 16:15

## 2018-01-01 RX ADMIN — CEFAZOLIN SODIUM 2 G: 2 SOLUTION INTRAVENOUS at 20:19

## 2018-01-01 RX ADMIN — FUROSEMIDE 40 MG: 10 INJECTION, SOLUTION INTRAMUSCULAR; INTRAVENOUS at 09:00

## 2018-01-01 RX ADMIN — DRONABINOL 2.5 MG: 2.5 CAPSULE ORAL at 10:01

## 2018-01-01 RX ADMIN — METRONIDAZOLE 500 MG: 500 TABLET, FILM COATED ORAL at 08:20

## 2018-01-01 RX ADMIN — MICONAZOLE NITRATE: 2 POWDER TOPICAL at 21:33

## 2018-01-01 RX ADMIN — DRONABINOL 2.5 MG: 2.5 CAPSULE ORAL at 08:48

## 2018-01-01 RX ADMIN — AMIODARONE HYDROCHLORIDE 200 MG: 200 TABLET ORAL at 20:17

## 2018-01-01 RX ADMIN — METRONIDAZOLE 500 MG: 500 TABLET, FILM COATED ORAL at 23:22

## 2018-01-01 RX ADMIN — METOPROLOL TARTRATE 25 MG: 25 TABLET ORAL at 20:17

## 2018-01-01 RX ADMIN — POTASSIUM CHLORIDE 20 MEQ: 750 TABLET, FILM COATED, EXTENDED RELEASE ORAL at 09:08

## 2018-01-01 RX ADMIN — Medication 125 MG: at 01:06

## 2018-01-01 RX ADMIN — Medication 100 MG: at 07:51

## 2018-01-01 RX ADMIN — MULTIPLE VITAMINS W/ MINERALS TAB 1 TABLET: TAB at 08:31

## 2018-01-01 RX ADMIN — CEFEPIME HYDROCHLORIDE 1 G: 1 INJECTION, POWDER, FOR SOLUTION INTRAMUSCULAR; INTRAVENOUS at 14:00

## 2018-01-01 RX ADMIN — FUROSEMIDE 40 MG: 40 TABLET ORAL at 08:36

## 2018-01-01 RX ADMIN — ENOXAPARIN SODIUM 30 MG: 30 INJECTION SUBCUTANEOUS at 20:39

## 2018-01-01 RX ADMIN — CEFAZOLIN SODIUM 2 G: 2 SOLUTION INTRAVENOUS at 21:29

## 2018-01-01 RX ADMIN — ENOXAPARIN SODIUM 30 MG: 30 INJECTION SUBCUTANEOUS at 15:24

## 2018-01-01 RX ADMIN — ATORVASTATIN CALCIUM 40 MG: 20 TABLET, FILM COATED ORAL at 19:53

## 2018-01-01 RX ADMIN — OXYCODONE HYDROCHLORIDE 5 MG: 5 TABLET ORAL at 19:47

## 2018-01-01 RX ADMIN — Medication 125 MG: at 07:02

## 2018-01-01 RX ADMIN — ENOXAPARIN SODIUM 30 MG: 30 INJECTION SUBCUTANEOUS at 20:02

## 2018-01-01 RX ADMIN — MULTIPLE VITAMINS W/ MINERALS TAB 1 TABLET: TAB at 08:44

## 2018-01-01 ASSESSMENT — PULMONARY FUNCTION TESTS
PIF_VALUE: 16
PIF_VALUE: 1
PIF_VALUE: 15
PIF_VALUE: 16
PIF_VALUE: 21
PIF_VALUE: 21
PIF_VALUE: 1
PIF_VALUE: 21
PIF_VALUE: 22
PIF_VALUE: 19
PIF_VALUE: 18
PIF_VALUE: 16
PIF_VALUE: 20
PIF_VALUE: 20
PIF_VALUE: 17
PIF_VALUE: 16
PIF_VALUE: 20
PIF_VALUE: 17
PIF_VALUE: 17
PIF_VALUE: 1
PIF_VALUE: 16
PIF_VALUE: 17
PIF_VALUE: 0
PIF_VALUE: 0
PIF_VALUE: 16
PIF_VALUE: 16
PIF_VALUE: 17
PIF_VALUE: 21
PIF_VALUE: 16
PIF_VALUE: 17
PIF_VALUE: 16
PIF_VALUE: 21
PIF_VALUE: 16
PIF_VALUE: 20
PIF_VALUE: 16
PIF_VALUE: 1
PIF_VALUE: 16
PIF_VALUE: 1
PIF_VALUE: 1
PIF_VALUE: 16
PIF_VALUE: 16
PIF_VALUE: 1
PIF_VALUE: 1
PIF_VALUE: 14
PIF_VALUE: 21
PIF_VALUE: 1
PIF_VALUE: 1
PIF_VALUE: 16
PIF_VALUE: 1
PIF_VALUE: 1
PIF_VALUE: 16
PIF_VALUE: 22
PIF_VALUE: 21
PIF_VALUE: 17
PIF_VALUE: 15
PIF_VALUE: 21
PIF_VALUE: 17
PIF_VALUE: 21
PIF_VALUE: 16
PIF_VALUE: 16
PIF_VALUE: 17
PIF_VALUE: 16
PIF_VALUE: 17
PIF_VALUE: 14
PIF_VALUE: 14
PIF_VALUE: 17
PIF_VALUE: 16
PIF_VALUE: 1
PIF_VALUE: 17
PIF_VALUE: 15
PIF_VALUE: 17
PIF_VALUE: 18
PIF_VALUE: 22
PIF_VALUE: 16
PIF_VALUE: 15
PIF_VALUE: 16
PIF_VALUE: 14
PIF_VALUE: 1
PIF_VALUE: 1
PIF_VALUE: 14
PIF_VALUE: 21
PIF_VALUE: 1
PIF_VALUE: 14
PIF_VALUE: 1
PIF_VALUE: 21
PIF_VALUE: 3
PIF_VALUE: 16
PIF_VALUE: 15
PIF_VALUE: 16
PIF_VALUE: 21
PIF_VALUE: 17
PIF_VALUE: 17
PIF_VALUE: 1
PIF_VALUE: 16
PIF_VALUE: 13
PIF_VALUE: 1
PIF_VALUE: 17
PIF_VALUE: 1
PIF_VALUE: 17
PIF_VALUE: 21
PIF_VALUE: 17
PIF_VALUE: 1
PIF_VALUE: 16
PIF_VALUE: 1
PIF_VALUE: 1
PIF_VALUE: 14
PIF_VALUE: 22
PIF_VALUE: 1
PIF_VALUE: 1
PIF_VALUE: 16
PIF_VALUE: 14
PIF_VALUE: 1
PIF_VALUE: 16
PIF_VALUE: 18
PIF_VALUE: 16
PIF_VALUE: 1
PIF_VALUE: 16
PIF_VALUE: 14
PIF_VALUE: 16
PIF_VALUE: 20
PIF_VALUE: 16
PIF_VALUE: 1
PIF_VALUE: 17
PIF_VALUE: 21
PIF_VALUE: 17
PIF_VALUE: 14
PIF_VALUE: 20
PIF_VALUE: 16
PIF_VALUE: 38
PIF_VALUE: 1
PIF_VALUE: 1
PIF_VALUE: 14
PIF_VALUE: 16
PIF_VALUE: 21
PIF_VALUE: 14
PIF_VALUE: 16
PIF_VALUE: 22
PIF_VALUE: 18
PIF_VALUE: 15
PIF_VALUE: 16
PIF_VALUE: 14
PIF_VALUE: 1
PIF_VALUE: 25
PIF_VALUE: 1
PIF_VALUE: 16
PIF_VALUE: 21
PIF_VALUE: 14
PIF_VALUE: 21
PIF_VALUE: 0
PIF_VALUE: 17
PIF_VALUE: 21
PIF_VALUE: 1
PIF_VALUE: 5
PIF_VALUE: 2
PIF_VALUE: 20
PIF_VALUE: 1
PIF_VALUE: 21
PIF_VALUE: 21
PIF_VALUE: 1
PIF_VALUE: 21
PIF_VALUE: 15
PIF_VALUE: 7
PIF_VALUE: 2
PIF_VALUE: 21
PIF_VALUE: 21
PIF_VALUE: 16
PIF_VALUE: 16
PIF_VALUE: 1
PIF_VALUE: 17
PIF_VALUE: 14
PIF_VALUE: 10
PIF_VALUE: 1
PIF_VALUE: 20
PIF_VALUE: 14
PIF_VALUE: 0
PIF_VALUE: 14
PIF_VALUE: 15
PIF_VALUE: 16
PIF_VALUE: 18
PIF_VALUE: 17
PIF_VALUE: 17
PIF_VALUE: 0
PIF_VALUE: 1
PIF_VALUE: 16
PIF_VALUE: 17
PIF_VALUE: 17
PIF_VALUE: 15
PIF_VALUE: 20
PIF_VALUE: 18
PIF_VALUE: 17
PIF_VALUE: 16
PIF_VALUE: 1
PIF_VALUE: 20
PIF_VALUE: 15
PIF_VALUE: 15
PIF_VALUE: 18
PIF_VALUE: 1
PIF_VALUE: 1
PIF_VALUE: 21
PIF_VALUE: 1
PIF_VALUE: 16
PIF_VALUE: 13
PIF_VALUE: 1
PIF_VALUE: 1
PIF_VALUE: 4
PIF_VALUE: 21
PIF_VALUE: 14
PIF_VALUE: 17
PIF_VALUE: 20
PIF_VALUE: 14
PIF_VALUE: 17
PIF_VALUE: 1
PIF_VALUE: 21
PIF_VALUE: 0
PIF_VALUE: 17
PIF_VALUE: 22
PIF_VALUE: 16
PIF_VALUE: 1
PIF_VALUE: 20
PIF_VALUE: 1
PIF_VALUE: 1
PIF_VALUE: 21
PIF_VALUE: 1
PIF_VALUE: 22
PIF_VALUE: 21
PIF_VALUE: 16
PIF_VALUE: 17
PIF_VALUE: 1
PIF_VALUE: 1
PIF_VALUE: 16
PIF_VALUE: 21
PIF_VALUE: 1
PIF_VALUE: 1
PIF_VALUE: 14
PIF_VALUE: 19
PIF_VALUE: 1
PIF_VALUE: 1
PIF_VALUE: 16
PIF_VALUE: 1
PIF_VALUE: 16
PIF_VALUE: 16
PIF_VALUE: 1
PIF_VALUE: 1
PIF_VALUE: 17
PIF_VALUE: 21
PIF_VALUE: 16
PIF_VALUE: 19
PIF_VALUE: 16
PIF_VALUE: 1
PIF_VALUE: 17
PIF_VALUE: 16
PIF_VALUE: 1
PIF_VALUE: 22
PIF_VALUE: 0
PIF_VALUE: 16
PIF_VALUE: 16
PIF_VALUE: 1
PIF_VALUE: 16
PIF_VALUE: 17
PIF_VALUE: 21
PIF_VALUE: 19
PIF_VALUE: 20
PIF_VALUE: 12
PIF_VALUE: 14
PIF_VALUE: 45.7
PIF_VALUE: 16
PIF_VALUE: 22
PIF_VALUE: 16
PIF_VALUE: 1
PIF_VALUE: 0
PIF_VALUE: 14
PIF_VALUE: 1
PIF_VALUE: 17
PIF_VALUE: 16
PIF_VALUE: 17
PIF_VALUE: 1
PIF_VALUE: 27
PIF_VALUE: 21
PIF_VALUE: 16
PIF_VALUE: 21
PIF_VALUE: 17
PIF_VALUE: 21
PIF_VALUE: 21
PIF_VALUE: 17
PIF_VALUE: 1
PIF_VALUE: 16
PIF_VALUE: 16
PIF_VALUE: 21
PIF_VALUE: 17
PIF_VALUE: 1
PIF_VALUE: 16
PIF_VALUE: 1
PIF_VALUE: 1
PIF_VALUE: 19
PIF_VALUE: 14
PIF_VALUE: 1
PIF_VALUE: 17
PIF_VALUE: 14
PIF_VALUE: 17
PIF_VALUE: 19
PIF_VALUE: 21
PIF_VALUE: 1
PIF_VALUE: 13
PIF_VALUE: 1
PIF_VALUE: 21
PIF_VALUE: 12
PIF_VALUE: 14
PIF_VALUE: 1
PIF_VALUE: 17
PIF_VALUE: 16
PIF_VALUE: 1
PIF_VALUE: 1
PIF_VALUE: 21
PIF_VALUE: 1
PIF_VALUE: 21
PIF_VALUE: 16
PIF_VALUE: 16
PIF_VALUE: 14
PIF_VALUE: 16
PIF_VALUE: 21
PIF_VALUE: 0
PIF_VALUE: 16
PIF_VALUE: 17
PIF_VALUE: 14
PIF_VALUE: 1
PIF_VALUE: 21
PIF_VALUE: 0
PIF_VALUE: 1
PIF_VALUE: 16
PIF_VALUE: 20
PIF_VALUE: 21
PIF_VALUE: 17
PIF_VALUE: 1
PIF_VALUE: 16
PIF_VALUE: 19
PIF_VALUE: 1
PIF_VALUE: 1
PIF_VALUE: 22
PIF_VALUE: 16
PIF_VALUE: 17
PIF_VALUE: 1
PIF_VALUE: 17
PIF_VALUE: 16
PIF_VALUE: 13
PIF_VALUE: 17
PIF_VALUE: 17
PIF_VALUE: 1
PIF_VALUE: 1
PIF_VALUE: 16
PIF_VALUE: 19
PIF_VALUE: 1
PIF_VALUE: 16
PIF_VALUE: 1
PIF_VALUE: 1

## 2018-01-01 ASSESSMENT — PAIN SCALES - GENERAL
PAINLEVEL_OUTOF10: 0
PAINLEVEL_OUTOF10: 4
PAINLEVEL_OUTOF10: 4
PAINLEVEL_OUTOF10: 2
PAINLEVEL_OUTOF10: 8
PAINLEVEL_OUTOF10: 3
PAINLEVEL_OUTOF10: 5
PAINLEVEL_OUTOF10: 5
PAINLEVEL_OUTOF10: 6
PAINLEVEL_OUTOF10: 5
PAINLEVEL_OUTOF10: 0
PAINLEVEL_OUTOF10: 0
PAINLEVEL_OUTOF10: 3
PAINLEVEL_OUTOF10: 0
PAINLEVEL_OUTOF10: 7
PAINLEVEL_OUTOF10: 0
PAINLEVEL_OUTOF10: 2
PAINLEVEL_OUTOF10: 0
PAINLEVEL_OUTOF10: 7
PAINLEVEL_OUTOF10: 3
PAINLEVEL_OUTOF10: 6
PAINLEVEL_OUTOF10: 0
PAINLEVEL_OUTOF10: 4
PAINLEVEL_OUTOF10: 7
PAINLEVEL_OUTOF10: 4
PAINLEVEL_OUTOF10: 0
PAINLEVEL_OUTOF10: 4
PAINLEVEL_OUTOF10: 0
PAINLEVEL_OUTOF10: 0
PAINLEVEL_OUTOF10: 2
PAINLEVEL_OUTOF10: 6
PAINLEVEL_OUTOF10: 2
PAINLEVEL_OUTOF10: 0
PAINLEVEL_OUTOF10: 2
PAINLEVEL_OUTOF10: 5
PAINLEVEL_OUTOF10: 3
PAINLEVEL_OUTOF10: 5
PAINLEVEL_OUTOF10: 2
PAINLEVEL_OUTOF10: 5
PAINLEVEL_OUTOF10: 0
PAINLEVEL_OUTOF10: 0
PAINLEVEL_OUTOF10: 1
PAINLEVEL_OUTOF10: 0
PAINLEVEL_OUTOF10: 4
PAINLEVEL_OUTOF10: 5
PAINLEVEL_OUTOF10: 8
PAINLEVEL_OUTOF10: 0
PAINLEVEL_OUTOF10: 2
PAINLEVEL_OUTOF10: 0
PAINLEVEL_OUTOF10: 5
PAINLEVEL_OUTOF10: 0
PAINLEVEL_OUTOF10: 0
PAINLEVEL_OUTOF10: 4
PAINLEVEL_OUTOF10: 10
PAINLEVEL_OUTOF10: 0
PAINLEVEL_OUTOF10: 0
PAINLEVEL_OUTOF10: 2
PAINLEVEL_OUTOF10: 7
PAINLEVEL_OUTOF10: 4
PAINLEVEL_OUTOF10: 5
PAINLEVEL_OUTOF10: 4
PAINLEVEL_OUTOF10: 0
PAINLEVEL_OUTOF10: 6

## 2018-01-01 ASSESSMENT — PAIN DESCRIPTION - PAIN TYPE
TYPE: SURGICAL PAIN
TYPE: ACUTE PAIN
TYPE: SURGICAL PAIN
TYPE: SURGICAL PAIN
TYPE: ACUTE PAIN
TYPE: OTHER (COMMENT)
TYPE: ACUTE PAIN
TYPE: OTHER (COMMENT)
TYPE: OTHER (COMMENT)
TYPE: ACUTE PAIN
TYPE: ACUTE PAIN
TYPE: OTHER (COMMENT)
TYPE: OTHER (COMMENT)
TYPE: SURGICAL PAIN
TYPE: ACUTE PAIN
TYPE: ACUTE PAIN
TYPE: SURGICAL PAIN
TYPE: ACUTE PAIN

## 2018-01-01 ASSESSMENT — PAIN DESCRIPTION - DESCRIPTORS
DESCRIPTORS: SORE
DESCRIPTORS: CRAMPING;SHARP
DESCRIPTORS: ACHING
DESCRIPTORS: CRAMPING;SHARP
DESCRIPTORS: ACHING
DESCRIPTORS: DISCOMFORT
DESCRIPTORS: DISCOMFORT
DESCRIPTORS: ACHING;PRESSURE
DESCRIPTORS: DISCOMFORT

## 2018-01-01 ASSESSMENT — PAIN DESCRIPTION - FREQUENCY
FREQUENCY: INTERMITTENT
FREQUENCY: CONTINUOUS
FREQUENCY: INTERMITTENT
FREQUENCY: CONTINUOUS
FREQUENCY: CONTINUOUS
FREQUENCY: INTERMITTENT
FREQUENCY: CONTINUOUS
FREQUENCY: CONTINUOUS

## 2018-01-01 ASSESSMENT — PAIN DESCRIPTION - LOCATION
LOCATION: ABDOMEN
LOCATION: BUTTOCKS
LOCATION: CHEST
LOCATION: CHEST
LOCATION: CHEST;INCISION
LOCATION: CHEST
LOCATION: ABDOMEN
LOCATION: CHEST
LOCATION: INCISION;CHEST
LOCATION: BUTTOCKS
LOCATION: BUTTOCKS
LOCATION: CHEST
LOCATION: CHEST
LOCATION: BUTTOCKS
LOCATION: BUTTOCKS
LOCATION: CHEST;INCISION
LOCATION: STERNUM
LOCATION: CHEST
LOCATION: BUTTOCKS

## 2018-01-01 ASSESSMENT — PAIN DESCRIPTION - ORIENTATION
ORIENTATION: MID
ORIENTATION: LEFT;RIGHT;UPPER
ORIENTATION: LEFT;UPPER
ORIENTATION: MID

## 2018-01-01 ASSESSMENT — PAIN DESCRIPTION - PROGRESSION
CLINICAL_PROGRESSION: NOT CHANGED
CLINICAL_PROGRESSION: NOT CHANGED
CLINICAL_PROGRESSION: GRADUALLY IMPROVING
CLINICAL_PROGRESSION: NOT CHANGED

## 2018-01-01 ASSESSMENT — PAIN DESCRIPTION - ONSET
ONSET: ON-GOING

## 2018-02-08 PROBLEM — Z98.890 S/P CARDIAC CATH: Status: ACTIVE | Noted: 2018-01-01

## 2018-02-08 PROBLEM — I25.119 CORONARY ARTERY DISEASE INVOLVING NATIVE CORONARY ARTERY OF NATIVE HEART WITH ANGINA PECTORIS (HCC): Status: ACTIVE | Noted: 2018-01-01

## 2018-02-08 NOTE — PROGRESS NOTES
Discharge instructions given to pt and daughter   Both \"voiced understanding\"    Chest  XRay  And vein mapping done  Unable to obtain UA  Rozinagene Oro in [facility] states they will obtain urine sample tomorrow. Discharged per wc per transport team with personal belongings without needs.    \"ready to go home\"   Pt voices understanding to avoid strenuous activity until after surgery

## 2018-02-08 NOTE — FLOWSHEET NOTE
Received in Cath Recovery. Report received from Cath Lab. Right femoral site has no signs of bleeding or swelling, area soft. Tegaderm dressing clean, dry, and intact. IV 1000 0.9 NS infusing at 75 ml per hour in right forearm. Denies pain or discomfort. Monitor showing Sinus drew. See Post Cath Assessment flowsheet.

## 2018-02-08 NOTE — CONSULTS
Cardiothoracic Surgery History & Physical        Patient:  Henry Mccloud  YOB: 1935    MRN: [id number]     Acct: [de-identified]    PCP: Alana Mcallister MD    Date of Admission: 2/8/2018    Chief Complaint:  No chief complaint on file. History Of Present Illness:  81 y.o. female recently hospitalized at outside facility with acute diverticulitis, was noted to have asymptomatic elevation of troponin, prompting an outpatient left heart catheterization, which showed 3v CAD. Patient reports only fatigue and dyspnea on exertion; she denies chest pain or pressure. She denies orthopnea or presyncope. Past Medical History:          Diagnosis Date    Cancer [facility])     uterine, breast    Gallstones     Gout     Hyperlipidemia     Hypertension     Osteoarthritis     S/P cardiac cath: 2/8/2018: LM 50%. LAD 50-60%. Diagonal 80%. LCX 99%. %. RCA 99%. LVEF 50-55%. 2/8/2018 2/8/2018: LM 50%. LAD 50-60%. Diagonal 80%. LCX 99%. %. RCA 99%. LVEF 50-55%. Dr. Rachid David       Past Surgical History:          Procedure Laterality Date    APPENDECTOMY      BREAST SURGERY Left     simple mastecomy    CATARACT REMOVAL Bilateral 07/17,08/2017    CHOLECYSTECTOMY      HYSTERECTOMY         Medications Prior to Admission:      Prior to Admission medications    Medication Sig Start Date End Date Taking? Authorizing Provider   amLODIPine (NORVASC) 10 MG tablet  11/15/17 2/8/18 Yes Historical Provider, MD   aspirin 81 MG tablet Take 81 mg by mouth daily. Yes Historical Provider, MD   metoprolol (TOPROL XL) 50 MG XL tablet Take 50 mg by mouth daily. Yes Historical Provider, MD   AmLODIPine Besylate (NORVASC PO) Take 10 mg by mouth daily. Yes Historical Provider, MD   allopurinol (ZYLOPRIM) 100 MG tablet Take 100 mg by mouth daily. Yes Historical Provider, MD   CALCIUM-VITAMIN D PO Take 600 mg by mouth 2 times daily.     Historical Provider, MD       Allergies:  Review of patient's allergies Left heart cath:  I have reviewed the left heart catheterization images with the following interpretation: high 3v CAD, with good surgical targets       Code Status: Full Code      ASSESSMENT:    Active Hospital Problems    Diagnosis Date Noted    S/P cardiac cath: 2/8/2018: LM 50%. LAD 50-60%. Diagonal 80%. LCX 99%. %. RCA 99%. LVEF 50-55%. [Z98.890] 02/08/2018     Priority: High       PLAN:  Plan CABG x 4 (LAD, PDA, OM, diag), scheduled for Monday, 2/12. The risks, benefits and alternatives were discussed in detail with the patient and family. The risks include bleeding, infection, graft failure, cardiac arrhythmias, thromboembolism, stroke, need for reoperation and death. The patient expressed understanding of these issues, confirmed that all questions were answered, and desires to proceed. Issues discussed in detail with the patient, who understands and has no further questions. Time spent with patient: 60 minutes, of which more than 50% was spent counseling/coordinating the patient's care.     Electronically signed by Laura Pacheco MD on 2/8/2018 at 1:35 PM

## 2018-02-08 NOTE — PLAN OF CARE
Problem: Falls - Risk of  Goal: Absence of falls  No falls     Problem: Discharge Planning:  Goal: Participates in care planning  Participates in care planning   Both pt and daughter voice understanding of discharge     Problem: Tissue Perfusion - Cardiopulmonary, Altered:  Goal: Hemodynamic stability will improve  Hemodynamic stability will improve   Right femoral site looks good with no bleeding or hematoma

## 2018-02-12 PROBLEM — I25.10 CAD IN NATIVE ARTERY: Status: ACTIVE | Noted: 2018-01-01

## 2018-02-12 NOTE — ANESTHESIA PROCEDURE NOTES
mild.  Leaflets normal.  Leaflet motions normal.    Pulmonic Valve: Annulus normal.  Stenosis not present. Regurgitation none. Aorta    Ascending Aorta:  Size normal.  Dissection not present. Aortic Arch:  Size normal.  Dissection not present. Descending Aorta:  Size normal.  Dissection not present. Atria    Right Atrium:  Size normal.  Spontaneous echo contrast not present. Left Atrium:  Size normal.  Spontaneous echo contrast not present. Left atrial appendage normal.      Septa    Atrial Septum:  Intra-atrial septal morphology normal.      Ventricular Septum:  Intra-ventricular septum morphology normal.      Diastolic Function Measurements:  Diastolic Dysfunction Grade= I (Delayed relaxation)  E= ms  A= ms  E/A Ratio=   DT= ms  S/D=  IVRT=    Other Findings  Pericardium:  normal      Anesthesia Information  Performed Personally  Anesthesiologist:  TRUMAN SAMUEL      Echocardiogram Comments:       POST CPB REPORT  LV: Preserved EF ~50-55%. No RWMA. MV: Mild regurg  AV: Mild stenosis  TV: Mild regurg  PV: Unchanged. RV: Normal systolic function  No PFO  No effusion  No dissection.

## 2018-02-12 NOTE — OP NOTE
DATE: 02/12/18    PATIENT: Get Rico    MRN: [id number]     Acct: [de-identified]    PREOP DIAGNOSIS:   Coronary artery disease    Postop diagnosis:  Coronary artery disease    Procedure:  1) Pump-assisted coronary artery bypass grafting x 2, with the left internal mammary artery grafted to the left anterior descending artery, a reversed greater saphenous vein graft originating from the intrapericardial left internal mammary artery grafted to the first diagonal artery  2) Endoscopic greater saphenous vein harvest  3) Insertion of left common femoral arterial line    SURGEON:  Thelma Laguna MD    ASSISTANT: OCHOA Gonzales    INDICATION:  The patient is a 81y.o. year-old female who presents with an elevated troponin during a recent admission for diverticulitis. This prompted a LHC showing 3v CAD. FINDNGS: There were no intrapericardial adhesions. The mammary artery and saphenous vein were of good caliber. The coronary targets were approximately 2 mm in diameter, but very diffusely diseased. The ascending aorta was extensively calcified, and was unsuitable for crossclamp application or placement of vein grafts. This forced the alteration of the operative plan from a conventional four-vessel coronary bypass on the arrested heart, to a two-vessel pump-assisted coronary bypass on the beating heart, with the vein graft originating from the LIMA. The right coronary system was found to be diffusely calcified, and thus ungraftable. CARDIOPULMONARY BYPASS TIME: 95    CROSSCLAMP TIME: 0    DESCRIPTION:  The patient was prepped and draped in sterile fashion in the supine position. A formal time-out was performed. Heparin was administered. Working in two teams, the chest was opened, while a segment of greater saphenous vein was harvested using endoscopic techniques from the right thigh. The left internal mammary artery was mobilized from the chest wall and divided at its distal bifurcation.  A retractor was placed, and a pericardial well was created. The ascending aorta was palpated and found to be extensively calcified. The proximal arch was soft, allowing placement of an arterial cannula. However, clearly it was impossible to apply a crossclamp, or to place vein grafts. Therefore, the operative plan was changed, as noted above. The proximal arch was cannulated with a 21 mm arterial cannula. A double stage venous cannula was inserted into the right atrium. We proceeded with the Y-anastomosis of the vein graft to the intrapericardial portion of the left internal mammary artery. A linear arteriotomy was made in the LIMA about 2 cm distal to its entry into the pericardium. The appropriately trimmed proximal end of the vein was anastomosed in end-to-side fashion to the LIMA with running 7-0 Prolene. At the conclusion of the anastomosis, there was excellent pulsatile flow from the distal ends of both vessels. We initially inspected the inferior wall, and performed a preliminary dissection of the the RCA and proximal PDA. Both were found to be prohibitively calcified. An apical suction device was applied, and the posterolateral wall was exposed. An arteriotomy was made in the first diagonal coronary artery, and a segment of reversed greater saphenous vein was anastomosed in end to side fashion using running 7-0 Prolene. The posterior wall was exposed. An arteriotomy was made in the mid first obtuse marginal coronary artery, and a separate segment of reversed greater saphenous vein was anastomosed in end to side fashion using running 7-0 Prolene. Patency was confirmed with the direct administration of cardioplegia down the graft at a rate to 60 ml/min. The anterolateral wall was exposed. The appropriately trimmed left internal mammary artery was anastomosed in end to side fashion to the mid proximal left anterior descending coronary artery using running 7-0 Prolene.   Patency was confirmed with an

## 2018-02-12 NOTE — PLAN OF CARE
Problem: Impaired respiratory status  Goal: Will be able to breathe spontaneously, without ventilator support  Will be able to breathe spontaneously, without ventilator support    Outcome: Ongoing  Patient continues on ventilator; has failed cpap went apnea ventilation; patient now being weaned by decreasing rate

## 2018-02-12 NOTE — CARE COORDINATION
2/12/18, 3:29 PM      Henry Mccloud       Admitted from:  for surgery 2/12/2018/ [facility] day: 0   Location: [address] [place] [address] Reason for admit: CAD in native artery [I25.10] Status: IP  Admit order signed?: yes  PMH:  has a past medical history of CAD (coronary artery disease); Cancer ([address] Gallstones; Gout; Hyperlipidemia; Hypertension; Osteoarthritis; and S/P cardiac cath: 2/8/2018: LM 50%. LAD 50-60%. Diagonal 80%. LCX 99%. %. RCA 99%. LVEF 50-55%. .  Procedure:   2/12 CABG x 2 vessel  2/12 Intubated  2/12 CXR:   1. Suboptimal inflation of lungs. Borderline heart size. Metallic sternotomy sutures. ET tube tip 4.5 cm proximal to mateus. Right jugular Water[place]-Jovana catheter with tip in right main pulmonary artery. Several surgical drainage tubes are present. 2. No pneumothorax is seen. 3. Mild postoperative atelectasis/pneumonia both perihilar regions and lung bases. No effusion.  No pneumothorax is seen     Medications:  Scheduled Meds:   sodium chloride flush  10 mL Intravenous 2 times per day    calcium replacement protocol   Other RX Placeholder    ceFAZolin (ANCEF) IVPB  2 g Intravenous Q8H    [START ON 2/13/2018] docusate sodium  100 mg Oral BID    metoprolol tartrate  25 mg Oral BID    amiodarone  200 mg Oral TID    [START ON 2/13/2018] mupirocin   Topical Once    [START ON 2/13/2018] therapeutic multivitamin-minerals  1 tablet Oral Daily with breakfast    [START ON 2/13/2018] atorvastatin  40 mg Oral Nightly    [START ON 2/13/2018] enoxaparin  40 mg Subcutaneous Daily    [START ON 2/13/2018] aspirin  325 mg Oral Daily    [START ON 2/14/2018] famotidine  20 mg Oral Daily    famotidine (PEPCID) injection  20 mg Intravenous Daily    albumin human  12.5 g Intravenous Once     Continuous Infusions:   sodium chloride      insulin (HUMAN R) non-weight based infusion      dextrose 5 % and 0.9 % NaCl        Pertinent Info/Orders/Treatment Plan: Immediate post-op on vent w/ETT on SIMV, peep 5,

## 2018-02-13 NOTE — PROGRESS NOTES
self  Pulmonary Function Test   []None available   []Normal   []Obstructive  []Restrictive   []Diffusion defect        LAB  Hematology:   Lab Results   Component Value Date    WBC 14.6 02/12/2018    RBC 2.54 02/12/2018    HGB 7.6 02/12/2018    HCT 22.8 02/12/2018     02/12/2018     Chemistry:   Lab Results   Component Value Date    PH 7.36 02/12/2018    PO2 87 02/12/2018    PCO2 41 02/12/2018    HCO3 23 02/12/2018    O2SAT 96 02/12/2018     Blood Culture:   Sputum Culture:     Home pulmonary medications: NA  Home Bipap or CPAP No  Pulmonary Diagnosis NA

## 2018-02-13 NOTE — PLAN OF CARE
Problem: DISCHARGE BARRIERS  Goal: Patient's continuum of care needs are met  Outcome: Ongoing  Pt from home alone, planning ecf at discharge, see  SW note.

## 2018-02-13 NOTE — PROGRESS NOTES
2043 Urine output continues at 20ml/hr. 12.5g albumin given. 2147 Patient completed Cpap trial and extubated by RT. Tolerated well. Upon extubation, patient able to speak strongly and clearly and is alert and oriented. 2200 Urine output increased to 30ml/hr. 2348 Urine output decreased again to 20ml/hr. Dr. Hank Maria called and updated on urine output and current cardiac index and output. Order received to start milrinone. 0200 Urine output continues to be 20ml/hr. 0315 Pt taken off cpap to do oral care and and assess IS. With coaching, patient achieved 500-600ml on incentive spirometry. 0530 Morning labs resulted with decreased Hgb/hct.      0541 Dr. Hank Maria called and updated on morning labs and decreased urine output. Order received to transfuse 1 unit Prbcs. 4297 Blood type and screen drawn and sent to lab. Awaiting results.

## 2018-02-14 NOTE — PROGRESS NOTES
[facility]  INPATIENT PHYSICAL THERAPY  EVALUATION  [facility] ICU 4D    Time In: 6843  Time Out: 1400  Timed Code Treatment Minutes: 0 Minutes  Minutes: 18          Date: 2018  Patient Name: Partha Frank,  Gender:  female        MRN: [id number]  : 1935  (81 y.o.)      Referring Practitioner: Dr Giuseppe Verduzco  Diagnosis: CAD   Additional Pertinent Hx: Admitted  for  scheduled CABG x 2 on 18     Past Medical History:   Diagnosis Date    CAD (coronary artery disease)     Cancer ([address]     uterine, breast    Gallstones     Gout     Hyperlipidemia     Hypertension     Osteoarthritis     S/P cardiac cath: 2018: LM 50%. LAD 50-60%. Diagonal 80%. LCX 99%. %. RCA 99%. LVEF 50-55%. 2018: LM 50%. LAD 50-60%. Diagonal 80%. LCX 99%. %. RCA 99%. LVEF 50-55%. Dr. Ulises Noguera     Past Surgical History:   Procedure Laterality Date    APPENDECTOMY      BREAST SURGERY Left     simple mastecomy    CARDIAC CATHETERIZATION  2018    CATARACT REMOVAL Bilateral ,2017    CHOLECYSTECTOMY      HYSTERECTOMY      PA OFFICE/OUTPT VISIT,PROCEDURE ONLY N/A 2018    CABG X2 with right endovein harvest, DANIELLE performed by Harvinder Pineda MD at [facility]       Restrictions/Precautions:  Restrictions/Precautions: Weight Bearing, Fall Risk, Surgical Protocols            Position Activity Restriction  Sternal Precautions: No Pushing, No Pulling, 5# Lifting Restrictions  Other position/activity restrictions: s/p CABG on 18-sternal precautions, chest tubes x 2, swan line in neck, O2, dias         Subjective:  Chart Reviewed: Yes  Patient assessed for rehabilitation services?: Yes  Family / Caregiver Present: Yes  Subjective: Pt in chair, agreed to PT and back to bed     General:  Overall Orientation Status: Within Functional Limits  Follows Commands: Within Functional Limits    Vision: Within Functional Limits    Hearing: Within functional limits         Pain:  Denies. Social/Functional History:    Lives With: Daughter  Type of Home: House  Home Layout: One level  Home Access: Stairs to enter with rails  Entrance Stairs - Number of Steps: 1  Home Equipment: Cane     Bathroom Shower/Tub: Walk-in shower  Bathroom Toilet: Standard  Bathroom Equipment: Grab bars in shower, Shower chair       ADL Assistance: [address]: Needs assistance  Ambulation Assistance: Independent  Transfer Assistance: Independent       Additional Comments: Pt reports using her cane only occassionally. Daughter works 1st shift. Objective:       RLE AROM: WFL         LLE AROM : WFL                 Strength RLE: Exception  Comment: 3+ to 4-/5, due to incisons and swelling    Strength LLE: Exception  Comment: 4-/5          Sensation  Overall Sensation Status: WNL       Balance  Sitting - Static: Fair  Sitting - Dynamic: Fair, -  Standing - Static: Poor, +  Standing - Dynamic: Poor    Sit to Supine: Maximum assistance (of 2 , HOB flat, no bedrail, watching sternal precautions)  Scooting: Maximal assistance (seated)    Transfers  Sit to Stand: Moderate Assistance (of 2 from chair with rocking motion with sternal precautions so pt held bear)  Stand to sit: Moderate Assistance (of 2  to bed with sternal precautions)       Ambulation 1  Surface: level tile  Device: No Device  Assistance: Moderate assistance, Maximum assistance (of 2)  Distance: pt able to take very slow shuffled steps, no foot clearance,cues to keep her head up and trunk upright, assist for balance with pt leaning  abit more to right and forward        Exercises:  Comments: 10 reps supine heelslides with min A on left and mod A on right, quad and glut sets and ankle pumps to imporve strength for function          Activity Tolerance:  Activity Tolerance: Patient limited by endurance; Patient limited by fatigue    Treatment Initiated: see there ex   Assessment:   Body structures, Functions, Activity limitations: Decreased

## 2018-02-14 NOTE — PROGRESS NOTES
Pharmacy Renal Adjustment Consult    Sona Veras is a 81 y.o. female. Pharmacy has been auto-consulted to renally adjust medications. Recent Labs      02/13/18   0455  02/14/18   0438   BUN  18  26*       Recent Labs      02/13/18   0455  02/14/18   0438   CREATININE  1.4*  1.6*       Estimated Creatinine Clearance: 34 mL/min (based on SCr of 1.6 mg/dL). Manually calculated creatinine clearance using ideal body weight of 59.3 results in a value of 26 mL/min. Height:   Ht Readings from Last 1 Encounters:   02/12/18 5' 6\" (1.676 m)     Weight:  Wt Readings from Last 1 Encounters:   02/14/18 230 lb 13.2 oz (104.7 kg)       Baseline SCr: 1.1    Plan: Adjust the following medications based on renal function:           Lovenox 40mg daily adjusted to 30mg daily. Esau Ricketts, PharmD  2/14/2018 8:41 AM

## 2018-02-14 NOTE — PROGRESS NOTES
PHASE 1 CARDIAC REHABILITATION   CABG, AVR, MVR, TVR, MI, PCI's  Exercise Physiologists      Vitals Stable?: Yes. If No:     Any ECG-Rhythm Issues?: No.  If Yes:   Transfers (bc: Bed to Chair, cb: Chair to Bed): [facility]  Strengthening/ROM Treatment Exercises: Step n/a    FIDM:     Frequency: 2 x per day   Intensity: <15 RPE, <120bpm   Duration: >30-60 seconds longer or >20% increase in distance each walk   Mode: Bed/Chair Exercises/stationary marching or ambulation    Aerobic treatment: Sit to stand with gait belt with heavy heavy assist.  Patient unable to lift feet; with pivot patient from bed to chair. Gait (i: Independent, s: Steady, u: Unsteady): U  Assists: 2 heavy assist  Patient tolerated treatment (w: well, f: fair, p: poor): P  Goals:    Short term:  Progression on each aerobic treatment. Long term: Independent ambulation and discharge. Abdi Worthington is to follow sternal precautions. Will learn techniques for getting in and out of bed/chairs/car without using the arms. Using stairs will be addressed if applicable. Home activity instructions (Frequency, Intensity, Duration, Mode), and progression will be addressed prior to discharge (unless Abdi Worthington goes to TCU or an ECF where they will follow PT protocols). Notes: Call light left within reach.     Education given:   Phase II Information (Given upon Discharge):

## 2018-02-14 NOTE — CARE COORDINATION
2/14/18, 12:14 PM    DISCHARGE BARRIERS    SW spoke with daughter Ramon Valdivia, states they are interested in [facility] as first choice and [facility] e[facility] as last option. SW notified CF. SW made referral to Thomas Alberto with [facility] for possible admission for [address] [place] [address].

## 2018-02-14 NOTE — PROGRESS NOTES
Cardiovascular Surgery Progress Note      Comfortable in chair, on NC  Stable, good hemodynamics (CI > 3), on milrinone 0.125 to support end organ perfusion  CXR wet, no significant space issues  Cr 1.6  -Start IV diuresis  -Continue fixed rate milrinone   -Transfer floor

## 2018-02-14 NOTE — PROGRESS NOTES
Nutrition Assessment    Type and Reason for Visit: Reassess ([facility] FU)    Nutrition Recommendations: Advance diet as tolerated. Will educate as appropriate. Malnutrition Assessment:  · Malnutrition Status: No malnutrition    Nutrition Diagnosis:   · Problem: Increased nutrient needs  · Etiology: related to Acute injury/trauma     Signs and symptoms:  as evidenced by Presence of wounds ([facility] 2/12)    Nutrition Assessment:  · Subjective Assessment: Pt. extubated s/p CABG and on FL diet; no difficulty swallowing per RN; pt. very tired and not up to education today; chemstick 149; BM 0 - nutrition ileus protocol initiated; meds include colace,lasix,MVI,insulin gtt; A1c 4.8%  · Wound Type: Surgical Wound (2/12 CABGx2)  · Current Nutrition Therapies:  · Oral Diet Orders: FL, 1800 ADA,RONY   · Anthropometric Measures:  · Ht: 5' 6\" (167.6 cm)   · Current Body Wt: 230 lb (104.3 kg) (2/14 with +1 edema)  · Admission Body Wt: 216 lb 0.8 oz (98 kg) (2/12/18 no edema)  · Usual Body Wt: 211 lb (95.7 kg) (2/8/18)  · Ideal Body Wt: 130 lb (59 kg),   · Adjusted Body Wt: 151 lb 14.4 oz (68.9 kg), body weight adjusted for Obesity  · BMI Classification: BMI 35.0 - 39.9 Obese Class II (37.3)  · Comparative Standards (Estimated Nutrition Needs):  · Estimated Daily Total Kcal: ~1721 kcals  · Estimated Daily Protein (g): ~82 grams    Estimated Intake vs Estimated Needs: Intake Improving    Nutrition Risk Level: High    Nutrition Interventions:   Continue current diet (advance diet as tolerated)  Continued Inpatient Monitoring, Education not appropriate at this time    Nutrition Evaluation:   · Evaluation: Progressing toward goals   · Goals: Pt. to consume greater than 75% of meals during LOS    · Monitoring: Diet Progression, Meal Intake, Diet Tolerance, Skin Integrity, Wound Healing, Ascites/Edema, Weight, Pertinent Labs, Patient/Family Education    See Adult Nutrition Doc Flowsheet for more detail.      Electronically signed by

## 2018-02-15 NOTE — PROGRESS NOTES
PHASE 1 CARDIAC REHABILITATION   CABG, AVR, MVR, TVR, MI, PCI's  Exercise Physiologists      Vitals Stable?: Yes. Any ECG-Rhythm Issues?: No.  If Yes: Pt went into STach after standing  Transfers (bc: Bed to Chair, cb: Chair to Bed): N/A  Strengthening/ROM Treatment Exercises: Step 2    FIDM:     Frequency: 2 x per day   Intensity: <15 RPE, <120bpm   Duration: >30-60 seconds longer or >20% increase in distance each walk   Mode: Bed/Chair Exercises/stationary marching or ambulation    Aerobic treatment: Sit-to-Stand, two heavy assists        Gait (i: Independent, s: Steady, u: Unsteady): U, pt had to be held up once standing, could not march from here without feeling like she was going to fall  Assists: 2  Patient tolerated treatment (w: well, f: fair, p: poor): f  Goals:    Short term:  Progression on each aerobic treatment. Long term: Independent ambulation and discharge. Mystic Duel is to follow sternal precautions. Will learn techniques for getting in and out of bed/chairs/car without using the arms. Using stairs will be addressed if applicable. Home activity instructions (Frequency, Intensity, Duration, Mode), and progression will be addressed prior to discharge (unless Mystic Duel goes to TCU or an ECF where they will follow PT protocols). Notes: Call light left within reach.     Education given:   Phase II Information (Given upon Discharge):

## 2018-02-15 NOTE — PROGRESS NOTES
Functional Mobility Training, Gait Training, Transfer Training, Equipment Evaluation, Education, & procurement    Goals:  Patient goals : Get stronger and walk    Short term goals  Time Frame for Short term goals: 2 weeks   Short term goal 1: supine to sit and return with Min of 1 to get in andout of bed   Short term goal 2: sit to stand with Min of 1 to get on and off various surfaces  Short term goal 3: ambulate with AD or HHA of 2 for 25 feet with Min A    Long term goals  Time Frame for Long term goals : NA due to short ELOS            AM-PAC Inpatient Mobility without Stair Climbing Raw Score : 7  AM-PAC Inpatient without Stair Climbing T-Scale Score : 28.66  Mobility Inpatient CMS 0-100% Score: 86.29  Mobility Inpatient without Stair CMS G-Code Modifier : CM

## 2018-02-15 NOTE — PROGRESS NOTES
[facility] RESPIRATORY ASSESSMENT PROGRAM (RAP)  30 kg and over      Patient Name: Lillian Lake Room#: [id number] : 1935     Admitting diagnosis: CAD in native artery [I25.10]      ASSESSMENT     Vitals  Pulse: 77   Resp: 22  BP: 121/79  SpO2: 90 %  Temp: 98.4 °F (36.9 °C)  Breath Sounds:Clear throughout all lung fields  Comment: Encouraged patient to do IS Q1-2WA    PEF   Predicted: na  Personal Best: na     Inspiratory Capacity:   Preoperative/predictive value: 1000 ml   33% of value: 333 ml      75% of value: 750 ml   10 ml/kg of IBW: 593 ml   Patient's Actual Inspiratory Capacity: 500 ml    CARE PLAN  All Care Plan selections must be based on the approved algorithms located on line in the Policy and Procedures under Respiratory Assessment Adult Protocol Handbook    INDICATIONS FOR THERAPY BASED ON HISTORY AND ASSESSMENT  Bronchial Hygiene Goal: Improvement in sputum mobilization in patients with ineffective airway clearance. Reverse the atelectasis. [] Atelectasis caused by mucus plugging     [] Chronic mucucillary clearance disorder  [] Improve cough effectiveness. Copious secretions unable to clear with cough or suctioning.    [x] No indications  Volume Expansion Goal: Prevent atelectasis, Absence or improvement in signs of atelectasis, improve respiratory muscle performance  [x] Post Thoracic or upper abdominal surgery    [] Surgery in COPD patients  [] Atelectasis, reduced lung volume on X-ray    [] CPAP indications are seen  [] Restrictive pulmonary or neuromuscular disorder   [] No indications  Inhaled Medications Goal: Improve respiratory functions in patients with airway disease and decrease WOB  [] Wheezing, diminished, or absent breath sounds associated with a pulmonary disorder  [] Known COPD  [] Peak flow < 80% predicted or personal best for known asthma patients  [] Documented obstructive defect on pulmonary function testing which is reversible   [] With a mucolytic to prevent Bronchial Hygiene   []Sputum production > 25 ml/day       [] Improved chest x-ray  []Patients subjective response (easier clearance of secretions)      [] Improved breath sounds  []Improvement in PaO2 or oxygen saturation       [] Return of patient to home regime   []Improvement in ventilator variables    [x]Other: na.    Volume Expansion  []Decrease respiratory rate   [x]Resolution of fever    [x]Normal pulse rate    []Improved breath sounds   []Normal chest x-ray     [] Improved arterial oxygen tension (PaO2) and p(A-a)O2  []Return of IC to 75% of preop/predicted goal or an increase to 15 ml/kg of ideal body weight   []Other: na .    Inhaled Medication  []Improved assessment score   []Return of patient to home regime  [x]Other: na.    Oxygenation  []SpO2 greater than or equal to 92%   []Reversed signs of hypoxemia and improvement in WOB  []Correction of Hgb disorder    []Return of patient to home regime  [x]Other: na .      Action Plan Based on Assessment:   [x]Continue plan as ordered   []Change therapy based on algorithm   []Consult with physician  []Discontinue therapy and RAP (indications no longer present)  []Not enough information available, reassess in 24 hours  []Other: na.    Comments: will assess patient daily.   Will continue with IS therapy

## 2018-02-15 NOTE — PROGRESS NOTES
Assessment: 0-10  Pain Level: 8  Pain Type: Surgical pain  Pain Location: Incision; Chest  Pain Descriptors: Aching  Pain Frequency: Continuous  Clinical Progression: Not changed  Pain Intervention(s): Repositioned;RN notified;Emotional support;Rest;Relaxation techniques       Objective  Overall Cognitive Status: Exceptions  Initiation: Requires cues for some  Sequencing: Requires cues for some  Cognition Comment: drowsy throughout, increased time to complete tasks due to drowsiness/fatigue level    ADL  Grooming: Stand by assistance (washing/drying face while seated in chair)  UE Bathing: Maximum assistance; Increased time to complete;Verbal cueing (pt able to complete limited washing on UE and abdomen; required cues for initiation, sequencing of task)  LE Bathing: Dependent/Total (upper thighs and periarea only completed; pt declined need for lower legs and feet, declined standing to wash bottom)  UE Dressing: Maximum assistance (donning gown; pt able to assist minimally with pulling gown up towards shoulders)     Balance  Sitting Balance: Contact guard assistance (sitting unsupported in chair while OTR washed back)     Type of ROM/Therapeutic Exercise: AROM  Comment: Pt completed 3/5 CABG step II exercises while seated in chair. Pt tolerated completing X10 reps with minimal cues for overall technique. Pt tolerated fair, increased fatigue noted, and declined completing remainding 2 exercises. Exercises completed to increase overall strength and endurance needed for ADL tasks. Activity Tolerance:  Activity Tolerance: Patient limited by fatigue  Activity Tolerance: Pre vitals: HR 74 bpm; Sp02 96% on 2L O2 via NC; RR 22; /46. Vitals remained stable throughout session. ADLs completed this date to increase overall activity tolerance and endurance needed for further ADLs and mobility tasks. Pt declined full completion of ADLs, CABG step II exercises, and transfers/mobility due to fatigue.  Pt requesting to remain in chair until breakfast arrives and then educated re: returning to bed to rest after breakfast completed with pt verbalizing understanding. Assessment:     Performance deficits / Impairments: Decreased functional mobility , Decreased ADL status, Decreased endurance, Decreased balance, Decreased safe awareness  Prognosis: Fair  Discharge Recommendations: Continue to assess pending progress, Patient would benefit from continued therapy after discharge    Patient Education:  Patient Education: CABG exercises, importance of completing ADLs as able, discharge recommendations  Barriers to Learning: decreased alertness    Equipment Recommendations: Other: Continue to assess pending progress    Safety:  Safety Devices in place: Yes  Type of devices:  All fall risk precautions in place, Call light within reach, Nurse notified, Left in chair    Plan:  Times per week: 6x  Current Treatment Recommendations: Functional Mobility Training, Balance Training, Self-Care / ADL, Safety Education & Training, Endurance Training    Goals:  Patient goals : Pt did not state    Short term goals  Time Frame for Short term goals: 2 weeks  Short term goal 1: Complete various t/fs including BSC with 0>CGA x 2   Short term goal 2: Complete 2 min standing with CGA for increased ease of toileting  Short term goal 3: Complete mobility to [facility] or bedside chair with 0>CGA x 2  Short term goal 4: Complete step 2 cardiac exercises x 10 reps with min RBs to increase endurance to A with BADL  Short term goal 5: Complete LE dressing with mod A using LH AE & adapative strategies prn  Long term goals  Time Frame for Long term goals : No LTG set d/t short ELOS         AM-PAC Inpatient Daily Activity Raw Score: 11  AM-PAC Inpatient ADL T-Scale Score : 29.04  ADL Inpatient CMS 0-100% Score: 70.42  ADL Inpatient CMS G-Code Modifier : CL

## 2018-02-16 NOTE — CARE COORDINATION
2/16/18, 3:16 PM      Su Weeks day: 4  Location: -02/002-A Reason for admit: CAD in native artery [I25.10]   Procedure:   2/12 CABG x 2 vessel  2/12 Intubated - 2/12 Extubated  2/16 CTs and PW removed  2/16 CXR:   1.  Stable low lung volumes with bilateral mild compressive atelectasis throughout the lungs but mostly in the bases. 2.  Slight decrease in right pleural effusion. Stable small left pleural effusion     Treatment Plan of Care: POD #4. Changed to po lasix. CTs and PW removed. Heparin drip started - plan for RCA stent by Dr. Collette Cowden before discharge. On room air. Afib. +Cdiff - ATBs started. Cardiac Rehab/PT/OT. Dietitian consulted. Dietary ileus prevention protocol. Telemetry, I&O, daily weight, sternal precautions, IS, SCDs, wound care, flexiseal, dias care, ambulate. Heparin drip, Amio, asa, lipitor, pepcid, po lasix 40 mg daily, SSI ACHS, lopressor, po flagyl, prn roxicodone, K+, po vancomycin, Electrolyte replacement protocols. Creat 1.6, BUN 40, wbc 22.4, hgb 8.7. Core Measures: VTE, SCIP  PCP: Ana Luisa Thomas MD  Discharge Plan: Plan for IP Rehab/TCU vs [facility]. SW on case.

## 2018-02-16 NOTE — PLAN OF CARE
Problem: Nutrition  Goal: Optimal nutrition therapy  Outcome: Ongoing  Nutrition Problem: Increased nutrient needs  Intervention: Food and/or Nutrient Delivery: Continue current diet, Continue current ONS, Vitamin Supplement (Continue Glucerna TID and MVI w/minerals daily to aid in wound healing)  Nutritional Goals: Pt. to consume greater than 75% of meals during LOS

## 2018-02-16 NOTE — PROGRESS NOTES
PHASE 1 CARDIAC REHABILITATION   CABG, AVR, MVR, TVR, MI, PCI's  Exercise Physiologists      Vitals Stable?: Yes. Any ECG-Rhythm Issues?: No.    Transfers (bc: Bed to Chair, cb: Chair to Bed): bc  Strengthening/ROM Treatment Exercises: Step 2    FIDM:     Frequency: 2 x per day   Intensity: <15 RPE, <120bpm   Duration: >30-60 seconds longer or >20% increase in distance each walk   Mode: Bed/Chair Exercises/stationary marching or ambulation    Aerobic treatment: Transfer from bed to chair, stand 20 sec       Gait (i: Independent, s: Steady, u: Unsteady): u  Assists: 2  Patient tolerated treatment (w: well, f: fair, p: poor): f  Goals:    Short term:  Progression on each aerobic treatment. Long term: Independent ambulation and discharge. Jason Beard is to follow sternal precautions. Will learn techniques for getting in and out of bed/chairs/car without using the arms. Using stairs will be addressed if applicable. Home activity instructions (Frequency, Intensity, Duration, Mode), and progression will be addressed prior to discharge (unless Jason Beard goes to TCU or an ECF where they will follow PT protocols). Notes: Call light left within reach.     Education given:   Phase II Information (Given upon Discharge):

## 2018-02-16 NOTE — PROGRESS NOTES
Nutrition Assessment    Type and Reason for Visit: Reassess (PO Monitor)    Nutrition Recommendations: Continue current diet, Glucerna TID and MVI w/minerals daily to aid in wound healing. *Encourage po intake of meals daily. *Recommend routine weight checks. Malnutrition Assessment:  · Malnutrition Status: At risk for malnutrition    Nutrition Diagnosis:   · Problem: Increased nutrient needs  · Etiology: related to Acute injury/trauma     Signs and symptoms:  as evidenced by Presence of wounds ([facility] 2/12)    Nutrition Assessment:  · Subjective Assessment: Pt s/p CABG x2 on 2/12. Pt seen- she reports decreased appetite yet since surgery consuming juice, yogurt, and bits of food. Pt reports she likes the Glucerna and would like to continue it. Encourage intake of ONS. Pt reports she likes the Activa yogurt & decaf hot beverage with meals. Pt denies N/V/D/C. Last BM x8 on 2/15 and x4 on 2/14. Provided pt with Heart Healthy diet education today. Pt reports understanding and denies any questions or concerns at this time. Labs: BUN 40, Cr 1.6, GLucose 123, and Hg 8.7.   · Wound Type: Surgical Wound (2/12 CABGx2)  · Current Nutrition Therapies:  · Oral Diet Orders: Carb Control 4 Carbs/Meal, No Added Salt (3-4gm)   · Oral Diet intake: 1-25%  · Oral Nutrition Supplement (ONS) Orders: Diabetic Oral Supplement (Glucerna TID (220 kcals, 10 grams protein/serving))  · ONS intake: Unable to assess (pt reports she likes them; unsure on how much she is consuming of them; pt states she drinks them occassionally)  · Anthropometric Measures:  · Ht: 5' 6\" (167.6 cm)   · Current Body Wt: 245 lb 9.5 oz (111.4 kg) (2/16; +1 non-pitting edema in extremities)  · Admission Body Wt: 216 lb 0.8 oz (98 kg) (2/12/18 no edema)  · Usual Body Wt: 211 lb (95.7 kg) (2/8/18)  · % Weight Change: in 4 days since admission; + edema noted in extremities on Lasix  · [place] Body Wt: 130 lb (59 kg), % Ideal Body noted weight is up +13.5% · Adjusted Body Wt: 158 lb 12 oz (72 kg), body weight adjusted for Obesity  · BMI Classification: BMI 35.0 - 39.9 Obese Class II (39.7)  · Comparative Standards (Estimated Nutrition Needs):  · Estimated Daily Total Kcal: 9541-5049 kcal/day  · Estimated Daily Protein (g): 86-94 g/day     Estimated Intake vs Estimated Needs: Intake Less Than Needs    Nutrition Risk Level: High    Nutrition Interventions:   Continue current diet, Continue current ONS, Vitamin Supplement (Continue Glucerna TID and MVI w/minerals daily to aid in wound healing)  Continued Inpatient Monitoring, Education Completed (Completed Heart Healthy Diet Education on 2/16/18 from the [facility]. RD contact information provided. Encouraged good po intake of meals and ONS during LOS)    Nutrition Evaluation:   · Evaluation: Progressing toward goals   · Goals: Pt. to consume greater than 75% of meals during LOS    · Monitoring: Meal Intake, Supplement Intake, Diet Tolerance, Skin Integrity, Wound Healing, Fluid Balance, Ascites/Edema, Weight    See Adult Nutrition Doc Flowsheet for more detail.      Electronically signed by Jami Lua RD, LD on 2/16/18 at 10:04 AM    Contact Number: [phone]

## 2018-02-17 NOTE — PROGRESS NOTES
Chest;Incision       Social/Functional:  Lives With: Daughter  Type of Home: House  Home Layout: One level  Home Access: Stairs to enter with rails  Entrance Stairs - Number of Steps: 1  Home Equipment: Cane     Objective:  Supine to Sit: Moderate assistance (VCs required to follow sternal precautions. Assist proved to guide LEs to EOB and to elevate trunk.)  Scooting: Maximal assistance (Toward EOB)    Transfers  Sit to Stand: Minimal Assistance (From EOB. Cues for sternal precautions.)  Stand to sit: Moderate Assistance (To BS chair)       Ambulation 1  Surface: level tile  Device: Rolling Walker  Assistance: Minimal assistance (Of 1)  Quality of Gait: Decreased ying and velocity. Short shuffled steps. Kyphotic posture. Pt. fatigues quickly and demos increased SOB. Pt. unsteady but with no LOB. Distance: 4' x 1         Balance  Comments: Pt. sat EOB with no UE support approximately 4' prior to transfers. Pt. steady but demos increased SOB throughout. Exercises:  Exercises  Comments: Pt. performed seated B  LE ther ex 10x each consisting of ankle pumps, glute/quad sets, long arc quads, heel slides, and hip abd/add all to increase strength and endurance to enhance functional mobility. Pt. limited secondary to fatigue and elevated HR. Activity Tolerance:  Activity Tolerance: Patient limited by fatigue;Patient limited by endurance    Assessment: Body structures, Functions, Activity limitations: Decreased functional mobility , Decreased ROM, Decreased strength, Decreased endurance  Assessment: Pt. tolerated session fair. Pt. limited by fatigue throughout session and required multiple rest breaks. Pt. demos increased SOB and elevated HR up to 140 during session. Pt. would benefit from continued skilled PT to increase strength and endurance to enhance functional mobility.    Prognosis: Good  REQUIRES PT FOLLOW UP: Yes  Discharge Recommendations: Continue to assess pending progress, ECF with PT,

## 2018-02-18 NOTE — PROGRESS NOTES
chair to complete grooming tasks, pt did not wish to do so and wanted to remain in her current position. )  Additional Comments: OTR encouraged pt to complete deoderant application and face washing, pt did not wish to complete these tasks on this date. Type of ROM/Therapeutic Exercise: AROM  Comment: Pt. completed 4/5 CABG step 2 exercises while supine with HOB elevated. Ot encouraged pt to sit EOB to complete, pt did not wish to do so on this windy. Pt. completed x2 trials with min cues for proper technique. RB given as needed,. d/t increased fatigue noted. Activity Tolerance:  Activity Tolerance: Patient Tolerated treatment well;Patient limited by fatigue    Assessment:     Performance deficits / Impairments: Decreased functional mobility , Decreased ADL status, Decreased endurance, Decreased balance, Decreased safe awareness  Prognosis: Fair  Discharge Recommendations: Continue to assess pending progress, Patient would benefit from continued therapy after discharge    Patient Education:  Patient Education: CABG exercises, importance of completing ADLs as able, discharge recommendations  Barriers to Learning: decreased alertness    Equipment Recommendations: Other: Continue to assess pending progress    Safety:  Safety Devices in place: Yes  Type of devices:  All fall risk precautions in place, Bed alarm in place, Call light within reach, Patient at risk for falls, Left in bed, Nurse notified  Restraints  Initially in place: No    Plan:  Times per week: 6x  Current Treatment Recommendations: Functional Mobility Training, Balance Training, Self-Care / ADL, Safety Education & Training, Endurance Training    Goals:  Patient goals : Pt did not state    Short term goals  Time Frame for Short term goals: 2 weeks  Short term goal 1: Complete

## 2018-02-19 NOTE — PROGRESS NOTES
PHASE 1 CARDIAC REHABILITATION   CABG, AVR, MVR, TVR, MI, PCI's  Exercise Physiologists      Vitals Stable?: Yes. Any ECG-Rhythm Issues?: No.     Transfers (bc: Bed to Chair, cb: Chair to Bed): na  Strengthening/ROM Treatment Exercises: Step 2    FIDM:     Frequency: 2 x per day   Intensity: <15 RPE, <120bpm   Duration: >30-60 seconds longer or >20% increase in distance each walk   Mode: Bed/Chair Exercises/stationary marching or ambulation    Aerobic treatment: Pt  Stood with walker approx 20 seconds and then refused to do more        Gait (i: Independent, s: Steady, u: Unsteady): u  Assists: 1  Patient tolerated treatment (w: well, f: fair, p: poor): p  Goals:    Short term:  Progression on each aerobic treatment. Long term: Independent ambulation and discharge. Yelena Arriaga is to follow sternal precautions. Will learn techniques for getting in and out of bed/chairs/car without using the arms. Using stairs will be addressed if applicable. Home activity instructions (Frequency, Intensity, Duration, Mode), and progression will be addressed prior to discharge (unless Yelena Arriaga goes to TCU or an ECF where they will follow PT protocols). Notes: Call light left within reach. Pt states that she is SOB and gets lightheaded before she stood.     Education given:   Phase II Information (Given upon Discharge):

## 2018-02-19 NOTE — PROGRESS NOTES
Marbella Finch 61  INPATIENT OCCUPATIONAL THERAPY  [facility] CVICU 4B  DAILY NOTE    Time:  Time In: 825  Time Out: 848  Timed Code Treatment Minutes: 23 Minutes  Minutes: 23    Date: 2018  Patient Name: Willean Galeazzi,   Gender: female      Room: [facility]002-  MRN: [id number]  : 1935  (81 y.o.)  Referring Practitioner: Dr. Erinn Farmer  Diagnosis: CAD  Additional Pertinent Hx: s/p scheduled CABG x 2 on 18    Restrictions/Precautions:  Restrictions/Precautions: Weight Bearing, Fall Risk, Surgical Protocols  Position Activity Restriction  Sternal Precautions: No Pushing, No Pulling, 5# Lifting Restrictions  Other position/activity restrictions: s/p CABG on 18-sternal precautions, chest tubes x 2, O2, dias    Past Medical History:   Diagnosis Date    CAD (coronary artery disease)     Cancer ([facility] [address]     uterine, breast    Gallstones     Gout     Hyperlipidemia     Hypertension     Osteoarthritis     S/P cardiac cath: 2018: LM 50%. LAD 50-60%. Diagonal 80%. LCX 99%. %. RCA 99%. LVEF 50-55%. 2018: LM 50%. LAD 50-60%. Diagonal 80%. LCX 99%. %. RCA 99%. LVEF 50-55%. Dr. Rossana Duncan     Past Surgical History:   Procedure Laterality Date    APPENDECTOMY      BREAST SURGERY Left     simple mastecomy    CARDIAC CATHETERIZATION  2018    CATARACT REMOVAL Bilateral ,2017    CHOLECYSTECTOMY      HYSTERECTOMY      MT OFFICE/OUTPT VISIT,PROCEDURE ONLY N/A 2018    CABG X2 with right endovein harvest, DANIELLE performed by Vanda Ramirez MD at [address] [place] [address]: Patient resting in bed upon arrival agreeable to OT treatment.  RN ok'd session   Overall Orientation Status: Within Functional Limits    Pain:  Pain Assessment  Patient Currently in Pain: Yes (at end of session, did not rate, AUSTIN Godinez aware)    Objective  ADL  Grooming: Setup (Seated in recliner chair to complete oral care, encouraged to complete at sink however patient declined d/t

## 2018-02-19 NOTE — PLAN OF CARE
Problem: Falls - Risk of  Goal: Absence of falls  Outcome: Ongoing  No falls have occurred. Continuing to monitor. Problem: Activity Intolerance:  Goal: Able to perform prescribed physical activity  Able to perform prescribed physical activity   Outcome: Ongoing  She is still pretty weak and requires quite a bit of encouragement. Continuing to push her to get better. Patient is very cooperative. Problem: Pain:  Goal: Control of acute pain  Control of acute pain   Outcome: Ongoing  No pain at this time. Continuing to monitor. Comments: Care plan reviewed with patient. Patient verbalize understanding of the plan of care and contribute to goal setting.

## 2018-02-19 NOTE — PLAN OF CARE
Problem: Nutrition  Goal: Optimal nutrition therapy  Outcome: Ongoing  Nutrition Problem: Inadequate oral intake  Intervention: Food and/or Nutrient Delivery: Continue current diet, Vitamin Supplement (Continue Marinol BID and MVI w/minerals daily.  Continue Glucerna & Kefir TID.)  Nutritional Goals: Pt. to consume greater than 75% of meals during LOS

## 2018-02-20 NOTE — PROGRESS NOTES
bronchospasm  [] Home regimen  [x] No indications  Oxygenation  Goal: Reverse hypoxemia and improve tissue oxygenation  [] SpO2 < 92%        [] Home oxygen   [] Severe trauma        [] Acute MI / chest pain  [] Significant clinical signs of hypoxemia     [] Post anesthesia short term  [] Hgb disorder        [x] No indications    THERAPIES SELECTED BASED ON ALGORITHMS  Bronchial Hygiene  []Vest  []PD&P []Suction or Rangel cough   []Acapella []Metaneb [x]No therapy recommended  Volume expansion  [x]Incentive Spirometry  []EZPAP    []Metaneb  []CPAP   []No therapy recommended  Inhaled Medication  []HHN  []MDI  []Two hour continuous  []Metaneb   [x] No therapy recommended  Oxygenation  []Nasal cannula  []Mask    []CPAP  []High flow   [x] No therapy recommended      BRONCHODILATOR ASSESSMENT SCORE  Score 0 1 2 3 4   Breath Sounds []  Normal or no wheezing with diminished bases []  End expiratory wheeze or slightly diminished []  Pronounced exp. wheeze []  Insp. & Exp. wheeze []  Absent or near absent   Dyspnea and level of distress   []  None, respiratory rate   12-20, regular pattern []  Only on exertion or increased respiratory rate 21-25 []  Mild dyspnea at rest, irregular breathing pattern respiratory rate 26-30 []  Moderate  use of accessory muscles, prolonged expiration respiratory rate 31-35 []  Severe    use of accessory muscles, respiratory rate   > 35   Peak flow []  > 80% []  70-80% []  60-69% []  50-59% []  <50%  or unable to perform   Total Score []  0-1 []  2-5 []  6-8 []  9-10 []  11-12   Frequency  Every 4 hours PRN for wheezing or increased respiratory distress Three times a day and Q4 PRN for wheezing or increased   respiratory distress Four times a day and Q 4 PRN  for wheezing or increased   respiratory distress Q 4 hours  Contact physician for a continuous treatment and  Iprotropium Bromide if indicated   Reassess Score No need  Every day Every day Every day  After treatment     ASSESSMENT OUTCOMES

## 2018-02-20 NOTE — PROGRESS NOTES
[address] 4B - 4B-02/002-A    Time In: 3534  Time Out: 8748  Timed Code Treatment Minutes: 45 Minutes  Minutes: 38          Date: 2018  Patient Name: Partha Frank,  Gender:  female        MRN: [id number]  : 1935  (81 y.o.)     Referring Practitioner: Dr Giuseppe Verduzco  Diagnosis: CAD   Additional Pertinent Hx: Admitted - for  scheduled CABG x 2 on 18     Past Medical History:   Diagnosis Date    CAD (coronary artery disease)     Cancer ([facility] [address]     uterine, breast    Gallstones     Gout     Hyperlipidemia     Hypertension     Osteoarthritis     S/P cardiac cath: 2018: LM 50%. LAD 50-60%. Diagonal 80%. LCX 99%. %. RCA 99%. LVEF 50-55%. 2018: LM 50%. LAD 50-60%. Diagonal 80%. LCX 99%. %. RCA 99%. LVEF 50-55%.  Dr. Ulises Noguera     Past Surgical History:   Procedure Laterality Date    APPENDECTOMY      BREAST SURGERY Left     simple mastecomy    CARDIAC CATHETERIZATION  2018    CATARACT REMOVAL Bilateral ,2017    CHOLECYSTECTOMY      HYSTERECTOMY      NE OFFICE/OUTPT VISIT,PROCEDURE ONLY N/A 2018    CABG X2 with right endovein harvest, DANIELLE performed by Harvinder Pineda MD at [facility]       Restrictions/Precautions:  Restrictions/Precautions: Weight Bearing, Fall Risk, Surgical Protocols            Position Activity Restriction  Sternal Precautions: No Pushing, No Pulling, 5# Lifting Restrictions  Other position/activity restrictions: s/p CABG on 18-sternal precautions, chest tubes x 2, O2, dias         Subjective:     Subjective: pt up in chair on arrival agreeable for therapy, pt cont to fatiuge easy and required rest breaks and encouraged increased activity this date     Pain:   .  Pain Assessment  Pain Level:  (no number given pt c/o of sore buttocks from rectal tube)       Social/Functional:  Lives With: Daughter  Type of Home: House  Home Layout: One level  Home Access: Stairs with functional mobility prior to discharge home   Prognosis: Good  REQUIRES PT FOLLOW UP: Yes  Discharge Recommendations: Continue to assess pending progress, Patient would benefit from continued therapy after discharge    Patient Education:  Patient Education: sternal precautions, gait, therex, bed mobility, transfers, deep breathing    Equipment Recommendations:  Equipment Needed: No    Safety:  Type of devices:  All fall risk precautions in place, Call light within reach, Gait belt, Left in bed, Nurse notified (RN present during session)  Restraints  Initially in place: No    Plan:  Times per week: 6 X CABG  Times per day: Daily  Specific instructions for Next Treatment: CABG step II ex, mobility,gait, steps, balance   Current Treatment Recommendations: Strengthening, Balance Training, Endurance Training, Functional Mobility Training, Gait Training, Transfer Training, Equipment Evaluation, Education, & procurement    Goals:  Patient goals : Get stronger and walk    Short term goals  Time Frame for Short term goals: 2 weeks   Short term goal 1: supine to sit and return with Min of 1 to get in andout of bed   Short term goal 2: sit to stand with Min of 1 to get on and off various surfaces  Short term goal 3: ambulate with AD or HHA of 2 for 25 feet with Min A    Long term goals  Time Frame for Long term goals : NA due to short ELOS            AM-PAC Inpatient Mobility without Stair Climbing Raw Score : 15  AM-PAC Inpatient without Stair Climbing T-Scale Score : 43.03  Mobility Inpatient CMS 0-100% Score: 47.43  Mobility Inpatient without Stair CMS G-Code Modifier : CHANDLER

## 2018-02-20 NOTE — PLAN OF CARE
Problem: Falls - Risk of  Goal: Absence of falls  Outcome: Ongoing  Generalize weakness, 1 assist walker and gait belt, unsteady gait, surgical pain, SOB on exertion, lower leg swelling. Problem: Discharge Planning:  Goal: Discharged to appropriate level of care  Discharged to appropriate level of care   Outcome: Ongoing  Discharge plan to ECF when stable. Problem: Gas Exchange - Impaired:  Goal: Levels of oxygenation will improve  Levels of oxygenation will improve   Outcome: Ongoing  Abnormal lung sounds, encourage IS, remains on room air, monitor vital signs    Problem: Pain:  Goal: Control of acute pain  Control of acute pain   Outcome: Ongoing  Pain medication given prn for abdominal pain. Problem: Tissue Perfusion - Cardiopulmonary, Altered:  Goal: Will show no evidence of cardiac arrhythmias  Will show no evidence of cardiac arrhythmias   Outcome: Ongoing  Patient remains on Amiodarone po, NSR or SB, continue to monitor     Problem: Nutrition  Goal: Optimal nutrition therapy  Outcome: Ongoing  Patient encourage to eat 3 meals per day, nutritionist consult ordered, educate importance of wound healing and protein. Problem: Bowel/Gastric:  Goal: Occurrences of diarrhea will decrease  Occurrences of diarrhea will decrease   Outcome: Ongoing  Patient still having diarrhea, flexiseal in place, monitor output. Problem: Skin Integrity:  Goal: Risk for impaired skin integrity will decrease  Risk for impaired skin integrity will decrease   Outcome: Ongoing  Incision care teaching done with patient, q2hr turns, barrier cream to buttocks due to redness, patient incontinent of stool. Comments: Care plan reviewed with patient and daughter. Patient and daughter verbalize understanding of the plan of care and contribute to goal setting.

## 2018-02-20 NOTE — PROGRESS NOTES
exs progressed, however, pt tolerated well with rest breaks     Activity Tolerance:  Activity Tolerance: Patient limited by fatigue    Assessment:     Performance deficits / Impairments: Decreased functional mobility , Decreased ADL status, Decreased endurance, Decreased balance, Decreased safe awareness  Prognosis: Fair  Discharge Recommendations: Continue to assess pending progress, Patient would benefit from continued therapy after discharge    Patient Education:  Patient Education: HEP, role of OT, ECTs, breathing tech  Barriers to Learning: decreased alertness    Equipment Recommendations: Other: Continue to assess pending progress    Safety:  Safety Devices in place: Yes  Type of devices:  All fall risk precautions in place, Call light within reach, Patient at risk for falls, Nurse notified, Bed alarm in place, Left in bed  Restraints  Initially in place: No    Plan:  Times per week: 6x  Current Treatment Recommendations: Functional Mobility Training, Balance Training, Self-Care / ADL, Safety Education & Training, Endurance Training    Goals:  Patient goals : Pt did not state    Short term goals  Time Frame for Short term goals: 2 weeks  Short term goal 1: Complete various t/fs including BSC with 0>CGA x 2   Short term goal 2: Complete 2 min standing with CGA for increased ease of toileting  Short term goal 3: Complete mobility to [facility] or bedside chair with 0>CGA x 2  Short term goal 4: Complete step 2 cardiac exercises x 10 reps with min RBs to increase endurance to A with BADL  Short term goal 5: Complete LE dressing with mod A using LH AE & adapative strategies prn  Long term goals  Time Frame for Long term goals : No LTG set d/t short ELOS

## 2018-02-20 NOTE — PROGRESS NOTES
02/20/18 8:44 AM    SW spoke with patient regarding discharge planning. Patient states that she would like to go directly to [facility] ([facility]) upon discharge. SW contacted [facility] to inform of patient; bed available, tentative discharge later this week. Patient accepted, facility will follow medical progress via care link. SW to continue to assist with transition to discharge.      WENDIE Quiñonez

## 2018-02-21 NOTE — PROGRESS NOTES
rails  Entrance Stairs - Number of Steps: 1  Home Equipment: Cane     Objective:  Sit to Supine: Maximum assistance (at trunk and LEs and once in bed pt completed bridges to middle of bed with min assist and required max assist to reposition shoulders in bed)  Scooting: Minimal assistance (to edge of surface and back in bed)    Transfers  Sit to Stand: Minimal Assistance (from bedside chair and arm chair pt maintained sternal precautions )  Stand to sit: Minimal Assistance (pt impulsive with first time sitting max cues to back up and for sternal precautions did better second time but cues to control descend)       Ambulation 1  Device: Rolling Walker  Assistance: Contact guard assistance  Quality of Gait: slow ying and flexed posture decreased step length at gene LEs pt needed much encouragement for increased distance she did take a rest break between walks   Distance: 10x2         Exercises:  Exercises  Comments: pt completed ex for increased strength pt completed ex reclined in chair ankle pumps, glut sets, qaud sets with cues, heelslides, hip abd/add, straight leg raises with min assist, seated edge of chair upper trunk rotations, shoulder horz abd/add, elbow flex/ext, shoulder shrugs, long arc qauds, hip flexion, toe raises x 10 reps each pt required rest breaks during exercises due to min SOB        Activity Tolerance:  Activity Tolerance: Patient limited by fatigue;Patient limited by endurance    Assessment:   Body structures, Functions, Activity limitations: Decreased functional mobility , Decreased ROM, Decreased strength, Decreased endurance  Assessment: pt tolerated session fair, pt cont to fatigue easy and require several rest breaks however she was able to tolerate increased activity this date, she still required hands on assist with mobility and gait and cues for sternal precautions, pt would benefit from cont skilled therapy to work on strength balance and increased independence with functional

## 2018-02-22 NOTE — PROGRESS NOTES
cues for sternal precautions, pt would benefit from cont skilled therapy to work on strength balance and increased independence with functional mobility prior to discharge home   Prognosis: Good  REQUIRES PT FOLLOW UP: Yes  Discharge Recommendations: Continue to assess pending progress, Patient would benefit from continued therapy after discharge    Patient Education:  Patient Education: sternal precautions, gait, therex, bed mobility, transfers, deep breathing    Equipment Recommendations:  Equipment Needed: No    Safety:  Type of devices:  All fall risk precautions in place, Call light within reach, Gait belt, Left in bed, Nurse notified (RN present during session)  Restraints  Initially in place: No    Plan:  Times per week: 6 X CABG  Times per day: Daily  Specific instructions for Next Treatment: CABG step II ex, mobility,gait, steps, balance   Current Treatment Recommendations: Strengthening, Balance Training, Endurance Training, Functional Mobility Training, Gait Training, Transfer Training, Equipment Evaluation, Education, & procurement    Goals:  Patient goals : Get stronger and walk    Short term goals  Time Frame for Short term goals: 2 weeks   Short term goal 1: supine to sit and return with Min of 1 to get in andout of bed   Short term goal 2: sit to stand with Min of 1 to get on and off various surfaces  Short term goal 3: ambulate with AD or HHA of 2 for 25 feet with Min A    Long term goals  Time Frame for Long term goals : NA due to short ELOS

## 2018-02-22 NOTE — PROGRESS NOTES
Acct: [de-identified]  Admit Date:  2/12/2018  Primary Cardiologist: Cath done by Dr Tamika Contreras    Chief Complaint/Reason for Consultation: MV CAD  Unable to do complete revascularization because of calcified aorta    Subjective (Events in last 24 hours):   Pt states she is weak and SOB. POD# 10 S/P 2V CABG   Has UTI with WBC ct 23.5.         Objective:   BP (!) 147/60   Pulse 72   Temp 97.6 °F (36.4 °C) (Oral)   Resp 16   Ht 5' 6\" (1.676 m)   Wt 225 lb 15.5 oz (102.5 kg)   SpO2 98%   BMI 36.47 kg/m²        TELEMETRY: NSR    Physical Exam:  General Appearance: alert and oriented to person, place and time, in no acute distress  Cardiovascular: normal rate, regular rhythm, normal S1 and S2, no murmurs, rubs, clicks, or gallops, distal pulses intact, no carotid bruits, no JVD  Pulmonary/Chest: clear to auscultation bilaterally- no wheezes, rales or rhonchi, normal air movement, no respiratory distress  Healing sternotomy  Abdomen: soft, non-tender, non-distended, normal bowel sounds, no masses Extremities: no cyanosis, clubbing or edema, pulse   Skin: warm and dry, no rash or erythema  Head: normocephalic and atraumatic  Eyes: pupils equal, round, and reactive to light  Neck: supple and non-tender without mass, no thyromegaly   Musculoskeletal: normal range of motion, no joint swelling, deformity or tenderness  Neurological: alert, oriented, normal speech, no focal findings or movement disorder noted    Medications:    carvedilol  6.25 mg Oral BID WC    cefepime  1 g Intravenous Q12H    dronabinol  2.5 mg Oral BID    miconazole   Topical BID    furosemide  40 mg Oral Daily    potassium chloride  20 mEq Oral Daily    enoxaparin  30 mg Subcutaneous Q24H    allopurinol  100 mg Oral Daily    potassium (CARDIAC) replacement protocol   Other RX Placeholder    metroNIDAZOLE  500 mg Oral Q8H    vancomycin  125 mg Oral 4x Daily    sodium chloride flush  10 mL Intravenous 2 times per day    docusate sodium  100 mg

## 2018-02-22 NOTE — CARE COORDINATION
2/22/18, 2:13 PM      Su Weeks day: 10  Location: -02/002-A Reason for admit: CAD in native artery [I25.10]   Procedure:   2/12 CABG x 2 vessel  2/12 Intubated - 2/12 Extubated  2/16 CTs and PW removed  2/21 CXR:   Stable bibasilar atelectasis and small bilateral pleural effusions. Questionable mild interstitial pulmonary edema     Treatment Plan of Care: POD #9. Cardiology wants Cdiff and UTI cleared before they will do PCI to RCA - PA will discuss with Dr. Tiara Wong. Lopressor changed to coreg for hypertension today and amio was stopped. On room air. Afebrile. +Cdiff. Urine +serratia marcescens. Cardiac Rehab/PT/OT. Dietitian consulted. Dietary ileus prevention protocol. Telemetry, I&O, daily weight, sternal precautions, IS, SCDs, wound care, flexiseal, dias care, ambulate. Asa, lipitor, coreg, IV cefepime, marinol, lovenox, pepcid, po lasix 40 mg daily, po flagyl, prn roxicodone, K+, po vancomycin, Electrolyte replacement protocols. WBC down to 23.5, Hgb 9.8. Core Measures: VTE, SCIP  PCP: Madeleine Flowers MD  Discharge Plan: Plan for [facility] SNF at discharge. SW on case.

## 2018-02-23 PROBLEM — Z95.1 S/P CABG X 2: Status: ACTIVE | Noted: 2018-01-01

## 2018-02-23 PROBLEM — E44.0 MODERATE MALNUTRITION (HCC): Status: ACTIVE | Noted: 2018-01-01

## 2018-02-23 NOTE — PROGRESS NOTES
Oral BID    therapeutic multivitamin-minerals  1 tablet Oral Daily with breakfast    atorvastatin  40 mg Oral Nightly    aspirin  325 mg Oral Daily    famotidine  20 mg Oral Daily          sodium chloride flush 10 mL PRN   potassium chloride 10 mEq PRN   acetaminophen 650 mg Q4H PRN   oxyCODONE 5 mg Q4H PRN   Or     oxyCODONE 10 mg Q4H PRN   magnesium hydroxide 30 mL Daily PRN   bisacodyl 10 mg Daily PRN   senna 10 mL Daily PRN   ondansetron 4 mg Q6H PRN   albuterol sulfate HFA 2 puff Q6H PRN       Diagnostics:  EKG:  NSR            Cardiac catheterization 2/8/2018  50% LM  60% LAD  99% LCX  100% OM  99% RCA  EF 50-55%    Operative report 2/12/2018  LIMA to LAD  SVG to Diag withT -graft to LIMA  Calcified aorta not able to be crossclamped    Lab Data:    Cardiac Enzymes:  No results for input(s): CKTOTAL, CKMB, CKMBINDEX, TROPONINI in the last 72 hours.     CBC:   Lab Results   Component Value Date    WBC 18.9 02/23/2018    RBC 2.83 02/23/2018    HGB 9.2 02/23/2018    HCT 27.2 02/23/2018     02/23/2018       CMP:    Lab Results   Component Value Date     02/23/2018    K 4.0 02/23/2018     02/23/2018    CO2 22 02/23/2018    BUN 29 02/23/2018    CREATININE 1.1 02/23/2018    LABGLOM 48 02/23/2018    GLUCOSE 101 02/23/2018    CALCIUM 7.9 02/23/2018       Hepatic Function Panel:    Lab Results   Component Value Date    ALKPHOS 84 10/16/2017    ALT 15 10/16/2017    AST 20 10/16/2017    PROT 8.0 10/16/2017    BILITOT 0.5 10/16/2017    BILIDIR <0.2 10/16/2017    LABALBU 4.4 10/16/2017       Magnesium:    Lab Results   Component Value Date    MG 2.6 02/15/2018       PT/INR:    Lab Results   Component Value Date    INR 1.37 02/12/2018       HgBA1c:    Lab Results   Component Value Date    LABA1C 4.8 02/09/2018       FLP:    Lab Results   Component Value Date    TRIG 129 02/08/2018    HDL 50 02/08/2018    LDLCALC 67 02/08/2018       TSH:  No results found for: TSH      Assessment:  · S/P 2 V CABG  · Was not able to have complete revascularization because of calcified aorta  · MV CAD  · Leukocytosis  · C-diff  · UTI        Plan:  · Continue BB/statin/ASA  · Start Plavix  · OK to transfer to [facility] from a cardiology standpoint  · F/U with Dr Estephania Jaimes in 2 weeks  The patient is educated on symptoms of heart disease that include chest pain and passing out, dizziness, etc and to report them if there is any change or go to emergency room.              Electronically signed by Mohsen Duncan PA-C on 2/23/2018 at 8:42 AM

## 2018-02-23 NOTE — CARE COORDINATION
2/23/18, 3:50 PM    Plan to discharge to [facility] today  Discharge plan discussed by  and . Discharge plan reviewed with patient/ family. Patient/ family verbalize understanding of discharge plan and are in agreement with plan. Understanding was demonstrated using the teach back method.        IMM Letter  IMM Letter given to Patient/Family/Significant other/Guardian/POA/by[de-identified]   IMM Letter date given[de-identified] 02/23/18  IMM Letter time given[de-identified] 1960

## 2018-02-23 NOTE — PROGRESS NOTES
Discharge instructions reviewed with patient and family and they verbalize understanding. Reviewed and educated patient and family on current medications, new medications, follow up appointments. Patient discharged with personal belongings. Telemetry monitor, wires removed from patient and cleansed and placed at nurses station. Patient taken to [facility] for discharge. Patient taken to [facility] by daughter.

## 2018-02-23 NOTE — PROGRESS NOTES
Nutrition Supplement (ONS) Orders: Diabetic Oral Supplement (Glucerna TID (220 kcal, 10 g protein), Greek Yogurt TID)  · ONS intake:  (pt reports she consumes x2 of them daily; declines other ONS/snacks at this time)  · Anthropometric Measures:  · Ht: 5' 6\" (167.6 cm)   · Current Body Wt: 225 lb 15.5 oz (102.5 kg) (2/2; +1 pitting edema BLE)  · Admission Body Wt: 216 lb 0.8 oz (98 kg) (2/12/18 no edema)  · Usual Body Wt: 211 lb (95.7 kg) (2/8/18)  · % Weight Change: in 2 weeks per chart reivew; + edema on Lasix; question weight accuracy? · Ideal Body Wt: 130 lb (59 kg), % Ideal Body +4.5% weight gain  · Adjusted Body Wt: 154 lb 1.6 oz (69.9 kg), body weight adjusted for Obesity  · BMI Classification: BMI 35.0 - 39.9 Obese Class II (36.5)  · Comparative Standards (Estimated Nutrition Needs):  · Estimated Daily Total Kcal: 7398-1738 kcal/day  · Estimated Daily Protein (g): 84-98 g/day    Estimated Intake vs Estimated Needs: Intake Less Than Needs    Nutrition Risk Level: High    Nutrition Interventions:   Continue current diet, Continue current ONS, Vitamin Supplement (Continue Marinol BID and MVI w/minerals daily. Continue Glucerna & Greek Yogrut TID)  Continued Inpatient Monitoring, Education Completed (Completed Heart Healthy Diet Education on 2/16/18 from the [facility]. RD contact information provided. Encouraged good po intake of meals and ONS during LOS)    Nutrition Evaluation:   · Evaluation: Progress towards goals declining   · Goals: Pt. to consume greater than 75% of meals during LOS    · Monitoring: Meal Intake, Supplement Intake, Diet Tolerance, Skin Integrity, Wound Healing, Fluid Balance, Ascites/Edema, Weight, Pertinent Labs, Diarrhea    See Adult Nutrition Doc Flowsheet for more detail.      Electronically signed by Jeana Barajas RD, LD on 2/23/18 at 9:33 AM    Contact Number: [phone]

## 2018-06-18 PROBLEM — I21.4 NSTEMI, INITIAL EPISODE OF CARE (HCC): Status: ACTIVE | Noted: 2018-01-01

## 2018-06-19 PROBLEM — R79.89 LOW TSH LEVEL: Status: ACTIVE | Noted: 2018-01-01

## 2018-06-19 PROBLEM — Z85.3 HISTORY OF BREAST CANCER: Status: ACTIVE | Noted: 2018-01-01

## 2018-06-19 PROBLEM — Z85.42 HISTORY OF UTERINE CANCER: Status: ACTIVE | Noted: 2018-01-01

## 2018-06-19 NOTE — FLOWSHEET NOTE
06/18/18 2200   Provider Notification   Reason for Communication Review case  (Patient on unit, no orders)   Provider Name Dr. Juliana Hines   Provider Notification Physician   Method of Communication Call   Response Other (Comment)  (Stated he will notify Dr. Chikis Schultz that the patient is here)   Notification Time [phone]
06/18/18 2339   Provider Notification   Reason for Communication Evaluate  (on unit to evaluate patient)   Provider Name Dr. Fatuma Cruz   Provider Notification Physician   Method of Communication Face to face   Response Other (Comment)  (stated will do cardiac cath in AM)   Notification Time 6180   Also instructed to order 300 mg of Plavix if morning labs are WNL.
06/19/18 0310   Provider Notification   Reason for Communication Evaluate  (chest pain, SOB)   Provider Name Dr. Maggie Harris   Provider Notification Physician   Method of Communication Secure Message   Response Waiting for response   Notification Time 0310   Perfect served to notify Dr. Maggie Harris of patient complaining of chest pain, and now describing feeling very short of breath. Note stated that patient is on nitro drip, increased over the last 50 minutes from 35 to 50 due to continued report of CP. 2 mg of morphine did not relieve pain, and chest tightness continues. Per rapid nurse, STAT EKG appeared improved from earlier EKG at 2233. Also mentioned that plan is for patient to have heart cath in the morning.
06/19/18 1120   Handoff   Communication Given Transfer Handoff   Oncoming Nurse/Offgoing Nurse cath lab    Handoff Communication Face to Face; At bedside   Time Handoff Given 1120   notified of chest pain increase, given Morphine, also nauseated ? ? From prednisone.  , no EKG obtained prior to leave
06/19/18 1120   Handoff   Communication Given Transfer Handoff   Oncoming Nurse/Offgoing Nurse cath lab    Handoff Communication Face to Face; At bedside   Time Handoff Given 1120   notified of pt currently having chest pain, had given morphine earlier, pain decreased from 5 to 4. Nauseated decrease after belching few times, premedicated with benadryl, steroids IV , prednisone.  Called for EKG, but not here prior to cath lab staff present
verified yesterday by the [facility] Dept. - Patient was a bit stressed when the nurse explained she was next to go to the heart CATH since her daughters weren't here yet. She was worried what they may think if they come and she isn't in the room, what will they think. We shared in some good thoughts prior to her leaving. Plan:  Continued support would be helpful for this patient. Allowing her to speak of \"end of life\" decisions and thoughts may be beneficial for her. Also, if she is willing to speak about the relationship with her son may be of benefit to her also.

## 2018-06-19 NOTE — CONSULTS
Surgical History:   Procedure Laterality Date    APPENDECTOMY      BREAST SURGERY Left     simple mastecomy    CARDIAC CATHETERIZATION  02/08/2018    CATARACT REMOVAL Bilateral 07/17,08/2017    CHOLECYSTECTOMY      HYSTERECTOMY      VT OFFICE/OUTPT VISIT,PROCEDURE ONLY N/A 2/12/2018    CABG X2 with right endovein harvest, DANIELLE performed by Mychal Luciano MD at [place] [facility]       Medications Prior to Admission:    Prescriptions Prior to Admission: amLODIPine (NORVASC) 5 MG tablet, Take 5 mg by mouth daily  ferrous sulfate 325 (65 Fe) MG tablet, Take 325 mg by mouth daily (with breakfast)  enalapril (VASOTEC) 5 MG tablet, Take 5 mg by mouth 2 times daily   metoprolol succinate (TOPROL XL) 50 MG extended release tablet, Take 50 mg by mouth 2 times daily  aspirin 81 MG tablet, Take 81 mg by mouth daily. allopurinol (ZYLOPRIM) 100 MG tablet, Take 100 mg by mouth daily. Multiple Vitamins-Minerals (THERAPEUTIC MULTIVITAMIN-MINERALS) tablet, Take 1 tablet by mouth daily (with breakfast)  CALCIUM-VITAMIN D PO, Take 600 mg by mouth Twice a Week     Allergies:    Patient has no known allergies. Social History:    reports that she has never smoked. She has never used smokeless tobacco. She reports that she does not drink alcohol or use drugs. Family History:   family history is not on file.     REVIEW OF SYSTEMS:  As above in the HPI, otherwise negative    PHYSICAL EXAM:    Vitals:  /61   Pulse 70   Temp 97.8 °F (36.6 °C) (Oral)   Resp 17   Wt 191 lb 9.6 oz (86.9 kg)   SpO2 97%   BMI 30.01 kg/m²     General Appearance: Alert and responsive, well developed and well- nourished, in no form of acute distress  Head: normocephalic and atraumatic  Eyes: pupils equal, round, and reactive to light, extraocular eye movements intact, conjunctivae normal  Neck: supple and non-tender without mass, no thyromegaly or thyroid nodules, no cervical lymphadenopathy  Pulmonary/Chest: clear to auscultation bilaterally- no wheezes, rales or rhonchi, normal air movement, no respiratory distress  Cardiovascular: Carotid and Femoral arteries are well felt, No bruit, NO JVD,   normal rate, regular rhythm, normal S1 and S2, no murmurs, rubs, clicks, or gallops,   Abdomen: soft, non-tender, non-distended, normal bowel sounds, no masses or organomegaly  MOISES: no flank or CVA tenderness  Skin: warm and dry, no rash or erythema, No subcutaneous nodules  Extremities: no cyanosis or clubbing , NO Pedal/ptetibial edema, NO muscle or joint tenderness  Musculoskeletal: normal range of motion, no joint swelling, deformity or tenderness  CNS: AAOx3, Cranial nerves intact, sensation intact to touch and pain, Normal muscle strength and tone, DTR intact, No focal sign   Psychiatry: Normal Judgement, appropriate mood and affect.   LABS:  No results found for: CKTOTAL, CKMB, CKMBINDEX, TROPONINT  CBC:   Lab Results   Component Value Date    WBC 18.9 02/23/2018    RBC 2.83 02/23/2018    HGB 9.2 02/23/2018    HCT 27.2 02/23/2018    MCV 96.1 02/23/2018    MCH 32.6 02/23/2018    MCHC 33.9 02/23/2018    RDW 17.2 02/23/2018     02/23/2018    MPV 7.5 02/23/2018     BMP:    Lab Results   Component Value Date     02/23/2018    K 4.0 02/23/2018     02/23/2018    CO2 22 02/23/2018    BUN 29 02/23/2018    LABALBU 4.4 10/16/2017    CREATININE 1.1 02/23/2018    CALCIUM 7.9 02/23/2018    LABGLOM 48 02/23/2018    GLUCOSE 101 02/23/2018     Magnesium:    Lab Results   Component Value Date    MG 2.6 02/15/2018    MG 2.2 02/14/2018    MG 2.1 02/14/2018     Creatinine:   Lab Results   Component Value Date    CREATININE 1.1 02/23/2018    CREATININE 1.2 02/22/2018    CREATININE 1.1 02/21/2018     Hepatic Function Panel:    Lab Results   Component Value Date    ALKPHOS 84 10/16/2017    ALT 15 10/16/2017    AST 20 10/16/2017    PROT 8.0 10/16/2017    BILITOT 0.5 10/16/2017    BILIDIR <0.2 10/16/2017    LABALBU 4.4 10/16/2017       Warfarin PT/INR:   Lab Results

## 2018-06-19 NOTE — PROGRESS NOTES
Obtained consents for both cardiac cath procedure and blood/blood products transfusion.
Respiratory care participated in Code. For further information see Code sheet.
Time of death [id number], pronounced per Dr. Michael Butt, absence of vital signs no neurological response. [address] notified, body released  1525 LOOP notified, body released. Forms to admitting.   Baljinder Sawant RN
wheezes. Gastrointestinal: Soft, NT/ND. Positive bowel sounds. Musculoskeletal: No edema. Warm  Neuro: No focal deficit. Moves extremity spontaneously. Psychiatry: Appropriate affect. Not agitated. Skin: Warm, dry with normal turgor. No rash      24HR INTAKE/OUTPUT:    Intake/Output Summary (Last 24 hours) at 06/19/18 0747  Last data filed at 06/19/18 0607   Gross per 24 hour   Intake            185.2 ml   Output              600 ml   Net           -414.8 ml     I/O last 3 completed shifts: In: 185.2 [I.V.:185.2]  Out: 600 [Urine:600]  No intake/output data recorded. Meds:    predniSONE  50 mg Oral Once    diphenhydrAMINE  50 mg Intravenous Once    hydrocortisone sodium succinate PF  200 mg Intravenous Once    sodium chloride flush  10 mL Intravenous 2 times per day    clopidogrel  300 mg Oral Once    metoprolol succinate  50 mg Oral BID    pantoprazole  40 mg Oral QAM AC    aspirin  81 mg Oral Daily       Infusions:    sodium chloride      heparin (porcine) 11.5 Units/kg/hr (06/19/18 0300)    nitroGLYCERIN 60 mcg/min (06/19/18 0257)         PRN Meds: nitroGLYCERIN, morphine, heparin (porcine), heparin (porcine)    Labs/imaging:  CBC:   Recent Labs      06/19/18   0153   WBC  15.9*   HGB  12.4   PLT  313         BMP:  Recent Labs      06/19/18   0153   NA  139   K  4.9   CL  103   CO2  20*   BUN  29*   CREATININE  1.2   GLUCOSE  115*         Hepatic: No results for input(s): AST, ALT, ALB, BILITOT, ALKPHOS in the last 72 hours. Troponin: No results for input(s): TROPONINI in the last 72 hours. BNP: No results for input(s): BNP in the last 72 hours. INR: No results for input(s): INR in the last 72 hours.       Jaron Thomas MD  -------------------------------  Rounding hospitalist

## 2018-06-19 NOTE — H&P
Oriented  []Other:      PLANNED PROCEDURE   []DANIELLE  []Pacemaker/ICD []Cardioversion  [x]Cath  [x]PCI   []LOOP RECORDER INSERTION OR REMOVAL  []Peripheral angiography      SEDATION  Planned agent:[x]Midazolam []Meperidine [x]Sublimaze []Morphine  []Diazepam  []Other:     ASA Classification:  []1 []2 [x]3 []4 []5  Class 1: A normal healthy patient  Class 2: Pt with mild to moderate systemic disease  Class 3: Severe systemic disease or disturbance  Class 4: Severe systemic disorders that are already life threatening. Class 5: Moribund pt with little chances of survival, for more than 24 hours. Mallampati I Airway Classification:   []1 []2 [x]3 []4    [x]Pre-procedure diagnostic studies complete and results available. Comment:    [x]Previous sedation/anesthesia experiences assessed. Comment:    [x]The patient is an appropriate candidate to undergo the planned procedure sedation and anesthesia. (Refer to nursing sedation/analgesia documentation record)  [x]Formulation and discussion of sedation/procedure plan, risks, and expectations with patient and/or responsible adult completed. [x]Patient examined immediately prior to the procedure. (Refer to nursing sedation/analgesia documentation record)  The cardiac catheterization and angioplasty with stent implantation procedure was explained in details to the patient and family. All the potential risks and complications including, but not limited to, bleeding, renal damage and failure, stroke, cardiac rupture, hematoma, infection, emergency bypass surgery, vessel tear, and even death, were explained. The patient and family expressed understanding and agreed to proceed.       Annita Wright  Electronically signed 6/18/2018 at 11:34 PM

## 2018-06-19 NOTE — CARE COORDINATION
18, 8:38 AM      Celi Kaye       Admitted from: Direct from [facility] 2154 Hospital day: 1   Location: --A Reason for admit: NSTEMI, initial episode of care [facility]) [I21.4] Status: IP  Admit order signed?: yes  PMH:  has a past medical history of CAD (coronary artery disease); Cancer ([address] Gallstones; Gout; Hyperlipidemia; Hypertension; Osteoarthritis; and S/P cardiac cath: 2018: LM 50%. LAD 50-60%. Diagonal 80%. LCX 99%. %. RCA 99%. LVEF 50-55%. .  Procedure:    PICC line placement planned   Cardiac cath planned  Pertinent abnormal Imagin/18 CXR  Postoperative sternotomy changes. 2. Stable cardiac enlargement with mild congestive failure.           Medications:  Scheduled Meds:   predniSONE  50 mg Oral Once    diphenhydrAMINE  50 mg Intravenous Once    hydrocortisone sodium succinate PF  200 mg Intravenous Once    sodium chloride flush  10 mL Intravenous 2 times per day    clopidogrel  300 mg Oral Once    lidocaine 1 % injection  5 mL Intradermal Once    sodium chloride flush  10 mL Intravenous 2 times per day    metoprolol succinate  50 mg Oral BID    pantoprazole  40 mg Oral QAM AC    aspirin  81 mg Oral Daily     Continuous Infusions:   sodium chloride      heparin (porcine) 11.5 Units/kg/hr (18 0300)    nitroGLYCERIN 60 mcg/min (18 0257)      Pertinent Info/Orders/Treatment Plan:  Hospitalist and Cardiology following. Telemetry. (+) troponin. IVF. ASA. Plavix. Heparin gtt. NTG gtt. Metoprolol. PRN morphine. Diet: Diet NPO Time Specified   DVT Prophylaxis: Heparin  Smoking status:  reports that she has never smoked.  She has never used smokeless tobacco.   Influenza Vaccination Screening Completed: n/a  Pneumonia Vaccination Screening Completed: yes  PCP: Elvi Horne MD  Readmission: No    Discharge Planning  Current Residence:  Private Residence  Living Arrangements:  Children   Support Systems:  Children, Family Members, [employer], Friends/Neighbors  Current Services PTA:     Potential Assistance Needed:  N/A  Potential Assistance Purchasing Medications:  No  Does patient want to participate in local refill/ meds to beds program?  No  Type of Home Care Services:  None  Patient expects to be discharged to:  home with daughter  Expected Discharge date:  06/20/18  Follow Up Appointment: Best Day/ Time: Wednesday AM    Discharge Plan: Spoke with patient, plans to return home with daughter at discharge. She does not feel HH will be needed at this time. CM continue to follow.       Evaluation: no

## 2018-06-19 NOTE — H&P
Conjunctivae/corneas clear. Neck: Supple, with full range of motion. No jugular venous distention. Trachea midline. Respiratory:  Normal respiratory effort. Clear to auscultation, bilaterally without Rales/Wheezes/Rhonchi. Cardiovascular:  Regular rate and rhythm with normal U8/T4, 4/6 SYSTOLIC murmur rubs or gallops. Abdomen: Soft, non-tender, non-distended with normal bowel sounds. Musculoskeletal:  No clubbing, cyanosis or edema bilaterally. Full range of motion without deformity. Skin: Skin color, texture, turgor normal.  No rashes or lesions. Neurologic:  Neurovascularly intact without any focal sensory/motor deficits. Cranial nerves: II-XII intact, grossly non-focal.  Psychiatric:  Alert and oriented, thought content appropriate, normal insight  Capillary Refill: Brisk,< 3 seconds   Peripheral Pulses: +2 palpable, equal bilaterally       Labs:     No results for input(s): WBC, HGB, HCT, PLT in the last 72 hours. No results for input(s): NA, K, CL, CO2, BUN, CREATININE, CALCIUM, PHOS in the last 72 hours. Invalid input(s): MAGNES  No results for input(s): AST, ALT, BILIDIR, BILITOT, ALKPHOS in the last 72 hours. No results for input(s): INR in the last 72 hours. No results for input(s): Bhumika Copas in the last 72 hours.     Urinalysis:      Lab Results   Component Value Date    NITRU NEGATIVE 02/23/2018    WBCUA NONE SEEN 02/23/2018    BACTERIA NONE 02/23/2018    RBCUA 0-2 02/23/2018    BLOODU NEGATIVE 02/23/2018    SPECGRAV 1.009 02/23/2018       Radiology:   EKG    DVT prophylaxis: [x] Lovenox                                 [] SCDs                                 [] SQ Heparin                                 [] Encourage ambulation           [] Already on Anticoagulation    Code Status: full code    Disposition:    [x] Home       [] TCU       [] Rehab       [] Psych       [] SNF       [] [place]       [] Other-    ASSESSMENT:    Active Hospital Problems    Diagnosis Date Noted    NSTEMI, initial episode of care [facility]) [I21.4] 06/18/2018       PLAN:    1. NSTEMI with mild cardiogenic pulmonary edema. Continue Nitro and heparin drip. 2. Will order CXR now. 3. Full dose aspirin given, beta blocker for HR>90, morphine IV prn.  4. Troponins x3, telemetry, cardiology consulted stat. Waiting for their call. 5. For C at Interventional cardiology discretion. Called IC and left a message to call me. 6. PEGGY. Repeat BMP/Mg now. Will hold off diuretics for now since BP is acceptable  7. GI and DVT ppx. Thank you Emerald Villanueva MD for the opportunity to be involved in this patient's care.     Electronically signed by Rollene Essex, MD on 6/18/2018 at 10:06 PM

## 2018-06-21 NOTE — PROCEDURES
[address] [place] [address]                              CARDIAC CATHETERIZATION    PATIENT NAME: Carolyn Cleevland                       :        1935  MED REC NO:   [id number]                           ROOM:       0005  ACCOUNT NO:   [de-identified]                           ADMIT DATE: 2018  PROVIDER:     ALEENA Perez OF PROCEDURE:  2018    PROCEDURE PERFORMED:  1. Cardiac catheterization with selective coronary angiography. 2.  LIMA angiography. 3.  Emergent insertion of Impella device which is a percutaneous left  ventricular assist device. 4.  Emergency insertion of transvenous temporary pacemaker. 5.  High-risk PCI and stenting of the tight lesion 99% of the right  coronary artery in the mid segment and of the 90% ostial lesion of the  right coronary artery. BRIEF HISTORY:  The patient is an 78-year-old  female with known  history of coronary artery disease who had incomplete surgical  revascularization on 2018, who was transferred from [facility] with chest pain and elevated troponin along with ST depression in the lateral precordial leads, consistent was non-ST  elevation myocardial infarction. Upon arrival to [address] [place] [address], the patient  was stable symptomatically and hemodynamically while on nitroglycerin and  heparin infusions. Cardiac catheterization was scheduled the next morning. At the cardiac catheterization appointment, I was informed that the patient  started being hypoxic while putting her on the cath lab table. The patient denied  chest pain at that time and denied shortness of breath. We called for  Respiratory Therapy to be around during the procedure and we proceeded with  the cardiac catheterization to define her coronary anatomy.   Of note, the  patient's electrocardiogram had improved from her initial electrocardiogram  obtained at [facility] with a plan of inserting  an Impella, the patient's oxygenation continued to deteriorate and I had to  place a nonrebreather mask. The patient did not tolerate the nonrebreather  mask and being busy focusing on the coronary revascularization and the left  ventricular support, I did not have any options but to continue to call for  anesthesia staff and I repeated that call several times and I asked if the  message was conveyed clearly to the anesthesiologist that I needed them  stat in the cath lab. While waiting for Anesthesia to arrive,  Unfortunately, the patient went into profound bradycardia and asystole and I  had to quickly insert a temporary transvenous pacemaker. CPR was  initiated and the patient went into ventricular tachycardia. At that time,  Anesthesia arrived and intubated the patient. Impella had been inserted  and femoral artery access was obtained on the left side and proper arterial  puncture sites were verified by angiography. The temporary pacemaker was  in place as well. We delivered three shocks that converted the patient  into sinus rhythm and I proceeded with intervention on the right coronary  artery with Impella support. Impella CP produced 3.6 liters per minute and  the patient's blood pressure was adequate on pressors and with the help of  Impella. I performed PCI and stenting of the mid RCA and then to the proximal and  ostial RCA with good results. At that time, the patient was in sinus  rhythm and on 20 mcg of Levophed. I decided to leave the Impella in  place along with pacemaker and transferred the patient to the unit. Since  the patient was seemingly doing well, I decided to wean the pressors and I gradually  weaned her off Levophed while in the cath lab. This was done gradually and  slowly until Levophed was turned off completely. I secured the venous  sheath in place and Impella sheath and closed the left arterial puncture  site using Angio-Seal closure device.   As I broke scrubs and was ready to  go talk to the family, I was informed that the patient's pressure plummeted  and the Impella showed a flat line. At that juncture, the patient was in  PEA and she did not have pulse and CPR was initiated along with ACLS  protocol. With CPR and epinephrine, the patient's pressure came back, but  the Impella was not functioning properly and showed very dampened wave. At  this juncture, I decided to insert an arterial line on the left femoral  approach and I inserted a 4-Welsh sheath which showed profound  hypotension. The patient received several doses of epinephrine and one  dose of vasopressin. CPR had to be performed on and off based on the  patient's presence or absence of pulse. Then, we realized that the patient  could not maintain spontaneous perfusion and she had to be fully dependent  on chest compression in order to get any perfusion. The patient had a pacemaker that was capturing properly and stat  echocardiogram did not show any pericardial effusion or perforation and the  Impella position was adequate. Echocardiogram, however, showed that the  left ventricle was barely moving globally. At this juncture, we felt that we needed to inform the family. Dr. Smaantha Lopez who graciously came to assist during this emergency procedure had  Talked to them already and I broke scrubs and talked to them myself and  informed them that what was going on. I told them that the patient is  fully dependent on ventilation and pacing and on chest compression and she was  not responding to very high doses of pressors and IV fluid boluses. The  family expressed that the patient had a living will and they did not want  her to go through any of the heroic measures that we were doing, and they  opted to stop CPR.   After confirming that in the presence of the  and with the  staff, I went back to the cath lab and informed the code team about the  family's decision and we stopped

## 2018-11-02 NOTE — PROGRESS NOTES
Shon Suazo Ryleeliv Finch 61  INPATIENT OCCUPATIONAL THERAPY  [facility] CVICU 4B  DAILY NOTE    Time:  Time In: 730  Time Out: 2902  Timed Code Treatment Minutes: 28 Minutes  Minutes: 28          Date: 2018  Patient Name: Maggie Mccoy,   Gender: female      Room: [facility]-A  MRN: [id number]  : 1935  (81 y.o.)  Referring Practitioner: Dr. Lucille Jimenez  Diagnosis: CAD  Additional Pertinent Hx: s/p scheduled CABG x 2 on 18    Restrictions/Precautions:  Restrictions/Precautions: Weight Bearing, Fall Risk, Surgical Protocols            Position Activity Restriction  Sternal Precautions: No Pushing, No Pulling, 5# Lifting Restrictions  Other position/activity restrictions: s/p CABG on 18-sternal precautions, chest tubes x 2, O2, dias         Past Medical History:   Diagnosis Date    CAD (coronary artery disease)     Cancer ([address]     uterine, breast    Gallstones     Gout     Hyperlipidemia     Hypertension     Osteoarthritis     S/P cardiac cath: 2018: LM 50%. LAD 50-60%. Diagonal 80%. LCX 99%. %. RCA 99%. LVEF 50-55%. 2018: LM 50%. LAD 50-60%. Diagonal 80%. LCX 99%. %. RCA 99%. LVEF 50-55%. Dr. Althea Calderon     Past Surgical History:   Procedure Laterality Date    APPENDECTOMY      BREAST SURGERY Left     simple mastecomy    CARDIAC CATHETERIZATION  2018    CATARACT REMOVAL Bilateral ,2017    CHOLECYSTECTOMY      HYSTERECTOMY      PA OFFICE/OUTPT VISIT,PROCEDURE ONLY N/A 2018    CABG X2 with right endovein harvest, DANIELLE performed by Jackelyn Hercules MD at [address]: RN okelpidioed therapy session. Pt sitting up in recliner when arrived. Pt agreeable to OT           Pain:  Pain Assessment  Patient Currently in Pain: Yes  Pain Level: 5  Pain Type: Acute pain  Pain Location: Buttocks       Objective          ADL  Additional Comments: pt delcined to complete all ADLS at this time.  Pt stated they were done last night and did Spoke with Em Maharaj with endo rt pt proposed endoscopy set for btn [phone] with Dr. Pop Abdul. Pt to remain NPO. Daughter at the bedside and updated on plan. wash face this am                Transfers  Sit to stand: Minimal assistance (from recliner with verbal cues for hand placement of using bear and not pushing or pulling for sternal precatuions )  Stand to sit: Minimal assistance (to recliner chair )       Balance  Sitting Balance: Stand by assistance (sitting edge of chair )  Standing Balance: Contact guard assistance (at RW )     Time: 20 seconds x2 trials   Activity: pt stood at [facility] and encouraged to stand for longer time but pt unable to complete due to dizziness   Comment: slow pace and fatigue noted. Pt was not able to stand for long period of time due to dizziness      Functional Mobility  Functional Mobility Comments: pt not able to ambulate today due to dizziness did notify RN of this and he to look into her blood pressure meds and daughter bringing in glasses            Type of ROM/Therapeutic Exercise: AROM  Comment: pt completed CABG step II exercises while sitting in recliner. tPt did require rest breaks due to fatigue. Pt completed exercises to increase activity tolerance for ADLS and transfers          Activity Tolerance:  Activity Tolerance: Patient limited by fatigue  Activity Tolerance: pt limited by dizziness and notified RN of this and he to check into BP meds. Assessment:     Performance deficits / Impairments: Decreased functional mobility , Decreased ADL status, Decreased endurance, Decreased balance, Decreased safe awareness  Prognosis: Fair  Discharge Recommendations: Continue to assess pending progress, Patient would benefit from continued therapy after discharge    Patient Education:  Patient Education: importance of mobility   Barriers to Learning: decreased alertness    Equipment Recommendations: Other: Continue to assess pending progress    Safety:  Safety Devices in place: Yes  Type of devices:  All fall risk precautions in place, Call light within reach, Left in chair, Nurse notified, Patient at risk for falls, Gait belt  Restraints  Initially in place: No    Plan:  Times per week: 6x  Current Treatment Recommendations: Functional Mobility Training, Balance Training, Self-Care / ADL, Safety Education & Training, Endurance Training    Goals:  Patient goals : Pt did not state    Short term goals  Time Frame for Short term goals: 2 weeks  Short term goal 1: Complete various t/fs including BSC with 0>CGA x 2   Short term goal 2: Complete 2 min standing with CGA for increased ease of toileting  Short term goal 3: Complete mobility to [facility] or bedside chair with 0>CGA x 2  Short term goal 4: Complete step 2 cardiac exercises x 10 reps with min RBs to increase endurance to A with BADL  Short term goal 5: Complete LE dressing with mod A using LH AE & adapative strategies prn  Long term goals  Time Frame for Long term goals : No LTG set d/t short ELOS

## 2020-10-23 NOTE — PROGRESS NOTES
Cardiovascular Surgery Progress Note      Doing well  Moderately hypertensive  WBC down to 23  -Continue Abx for Serratia UTI and C diff  -Replace metoprolol with coreg for hypertension  -D/c amiodarone no pain, swelling or deformity of joints

## (undated) DEVICE — 48" PROBE COVER W/GEL, ULTRASOUND, STERILE: Brand: SITE-RITE

## (undated) DEVICE — CARD SMRT DISP TRANSIT TIME MEDISTEM VERIQ

## (undated) DEVICE — SYSTEM VES HARV ENDOSCP VASOVIEW HEMOPRO